# Patient Record
Sex: FEMALE | Race: WHITE | NOT HISPANIC OR LATINO | URBAN - METROPOLITAN AREA
[De-identification: names, ages, dates, MRNs, and addresses within clinical notes are randomized per-mention and may not be internally consistent; named-entity substitution may affect disease eponyms.]

---

## 2021-06-15 ENCOUNTER — LAB VISIT (OUTPATIENT)
Dept: LAB | Facility: OTHER | Age: 66
End: 2021-06-15
Attending: STUDENT IN AN ORGANIZED HEALTH CARE EDUCATION/TRAINING PROGRAM
Payer: COMMERCIAL

## 2021-06-15 ENCOUNTER — OFFICE VISIT (OUTPATIENT)
Dept: INTERNAL MEDICINE | Facility: CLINIC | Age: 66
End: 2021-06-15
Payer: COMMERCIAL

## 2021-06-15 VITALS
HEART RATE: 71 BPM | SYSTOLIC BLOOD PRESSURE: 100 MMHG | WEIGHT: 127.63 LBS | HEIGHT: 64 IN | DIASTOLIC BLOOD PRESSURE: 58 MMHG | OXYGEN SATURATION: 96 % | BODY MASS INDEX: 21.79 KG/M2

## 2021-06-15 DIAGNOSIS — Z13.220 ENCOUNTER FOR LIPID SCREENING FOR CARDIOVASCULAR DISEASE: ICD-10-CM

## 2021-06-15 DIAGNOSIS — Z11.4 SCREENING FOR HIV (HUMAN IMMUNODEFICIENCY VIRUS): ICD-10-CM

## 2021-06-15 DIAGNOSIS — R73.03 PRE-DIABETES: ICD-10-CM

## 2021-06-15 DIAGNOSIS — N95.9 POST MENOPAUSAL PROBLEMS: ICD-10-CM

## 2021-06-15 DIAGNOSIS — Z12.11 SCREENING FOR MALIGNANT NEOPLASM OF COLON: ICD-10-CM

## 2021-06-15 DIAGNOSIS — Z00.00 PREVENTATIVE HEALTH CARE: Primary | ICD-10-CM

## 2021-06-15 DIAGNOSIS — Z13.6 ENCOUNTER FOR LIPID SCREENING FOR CARDIOVASCULAR DISEASE: ICD-10-CM

## 2021-06-15 DIAGNOSIS — Z00.00 PREVENTATIVE HEALTH CARE: ICD-10-CM

## 2021-06-15 DIAGNOSIS — Z11.59 NEED FOR HEPATITIS C SCREENING TEST: ICD-10-CM

## 2021-06-15 LAB
ALBUMIN SERPL BCP-MCNC: 4.4 G/DL (ref 3.5–5.2)
ALP SERPL-CCNC: 65 U/L (ref 55–135)
ALT SERPL W/O P-5'-P-CCNC: 16 U/L (ref 10–44)
ANION GAP SERPL CALC-SCNC: 10 MMOL/L (ref 8–16)
AST SERPL-CCNC: 19 U/L (ref 10–40)
BILIRUB SERPL-MCNC: 0.4 MG/DL (ref 0.1–1)
BUN SERPL-MCNC: 21 MG/DL (ref 8–23)
CALCIUM SERPL-MCNC: 10.4 MG/DL (ref 8.7–10.5)
CHLORIDE SERPL-SCNC: 103 MMOL/L (ref 95–110)
CO2 SERPL-SCNC: 27 MMOL/L (ref 23–29)
CREAT SERPL-MCNC: 1 MG/DL (ref 0.5–1.4)
EST. GFR  (AFRICAN AMERICAN): >60 ML/MIN/1.73 M^2
EST. GFR  (NON AFRICAN AMERICAN): 60 ML/MIN/1.73 M^2
ESTIMATED AVG GLUCOSE: 111 MG/DL (ref 68–131)
GLUCOSE SERPL-MCNC: 97 MG/DL (ref 70–110)
HBA1C MFR BLD: 5.5 % (ref 4–5.6)
POTASSIUM SERPL-SCNC: 4 MMOL/L (ref 3.5–5.1)
PROT SERPL-MCNC: 7.8 G/DL (ref 6–8.4)
SODIUM SERPL-SCNC: 140 MMOL/L (ref 136–145)
TSH SERPL DL<=0.005 MIU/L-ACNC: 0.77 UIU/ML (ref 0.4–4)

## 2021-06-15 PROCEDURE — 86703 HIV-1/HIV-2 1 RESULT ANTBDY: CPT | Performed by: STUDENT IN AN ORGANIZED HEALTH CARE EDUCATION/TRAINING PROGRAM

## 2021-06-15 PROCEDURE — 86803 HEPATITIS C AB TEST: CPT | Performed by: STUDENT IN AN ORGANIZED HEALTH CARE EDUCATION/TRAINING PROGRAM

## 2021-06-15 PROCEDURE — 3008F BODY MASS INDEX DOCD: CPT | Mod: CPTII,S$GLB,, | Performed by: STUDENT IN AN ORGANIZED HEALTH CARE EDUCATION/TRAINING PROGRAM

## 2021-06-15 PROCEDURE — 1126F PR PAIN SEVERITY QUANTIFIED, NO PAIN PRESENT: ICD-10-PCS | Mod: S$GLB,,, | Performed by: STUDENT IN AN ORGANIZED HEALTH CARE EDUCATION/TRAINING PROGRAM

## 2021-06-15 PROCEDURE — 99999 PR PBB SHADOW E&M-NEW PATIENT-LVL III: ICD-10-PCS | Mod: PBBFAC,,, | Performed by: STUDENT IN AN ORGANIZED HEALTH CARE EDUCATION/TRAINING PROGRAM

## 2021-06-15 PROCEDURE — 84443 ASSAY THYROID STIM HORMONE: CPT | Performed by: STUDENT IN AN ORGANIZED HEALTH CARE EDUCATION/TRAINING PROGRAM

## 2021-06-15 PROCEDURE — 80053 COMPREHEN METABOLIC PANEL: CPT | Performed by: STUDENT IN AN ORGANIZED HEALTH CARE EDUCATION/TRAINING PROGRAM

## 2021-06-15 PROCEDURE — 1126F AMNT PAIN NOTED NONE PRSNT: CPT | Mod: S$GLB,,, | Performed by: STUDENT IN AN ORGANIZED HEALTH CARE EDUCATION/TRAINING PROGRAM

## 2021-06-15 PROCEDURE — 3008F PR BODY MASS INDEX (BMI) DOCUMENTED: ICD-10-PCS | Mod: CPTII,S$GLB,, | Performed by: STUDENT IN AN ORGANIZED HEALTH CARE EDUCATION/TRAINING PROGRAM

## 2021-06-15 PROCEDURE — 83036 HEMOGLOBIN GLYCOSYLATED A1C: CPT | Performed by: STUDENT IN AN ORGANIZED HEALTH CARE EDUCATION/TRAINING PROGRAM

## 2021-06-15 PROCEDURE — 99386 PR PREVENTIVE VISIT,NEW,40-64: ICD-10-PCS | Mod: S$GLB,,, | Performed by: STUDENT IN AN ORGANIZED HEALTH CARE EDUCATION/TRAINING PROGRAM

## 2021-06-15 PROCEDURE — 36415 COLL VENOUS BLD VENIPUNCTURE: CPT | Performed by: STUDENT IN AN ORGANIZED HEALTH CARE EDUCATION/TRAINING PROGRAM

## 2021-06-15 PROCEDURE — 99999 PR PBB SHADOW E&M-NEW PATIENT-LVL III: CPT | Mod: PBBFAC,,, | Performed by: STUDENT IN AN ORGANIZED HEALTH CARE EDUCATION/TRAINING PROGRAM

## 2021-06-15 PROCEDURE — 99386 PREV VISIT NEW AGE 40-64: CPT | Mod: S$GLB,,, | Performed by: STUDENT IN AN ORGANIZED HEALTH CARE EDUCATION/TRAINING PROGRAM

## 2021-06-15 PROCEDURE — 80061 LIPID PANEL: CPT | Performed by: STUDENT IN AN ORGANIZED HEALTH CARE EDUCATION/TRAINING PROGRAM

## 2021-06-15 RX ORDER — MULTIVIT-MIN/FOLIC ACID/LUTEIN 400-250MCG
TABLET,CHEWABLE ORAL
COMMUNITY
End: 2022-11-03

## 2021-06-16 LAB
CHOLEST SERPL-MCNC: 221 MG/DL (ref 120–199)
CHOLEST/HDLC SERPL: 3 {RATIO} (ref 2–5)
HDLC SERPL-MCNC: 73 MG/DL (ref 40–75)
HDLC SERPL: 33 % (ref 20–50)
HIV 1+2 AB+HIV1 P24 AG SERPL QL IA: NEGATIVE
LDLC SERPL CALC-MCNC: 100.4 MG/DL (ref 63–159)
NONHDLC SERPL-MCNC: 148 MG/DL
TRIGL SERPL-MCNC: 238 MG/DL (ref 30–150)

## 2021-06-17 LAB — HCV AB SERPL QL IA: NEGATIVE

## 2021-06-25 LAB — NONINV COLON CA DNA+OCC BLD SCRN STL QL: NEGATIVE

## 2021-06-30 ENCOUNTER — TELEPHONE (OUTPATIENT)
Dept: INTERNAL MEDICINE | Facility: CLINIC | Age: 66
End: 2021-06-30

## 2021-06-30 DIAGNOSIS — Z12.31 OTHER SCREENING MAMMOGRAM: ICD-10-CM

## 2021-07-23 ENCOUNTER — CLINICAL SUPPORT (OUTPATIENT)
Dept: URGENT CARE | Facility: CLINIC | Age: 66
End: 2021-07-23
Payer: COMMERCIAL

## 2021-07-23 ENCOUNTER — PATIENT MESSAGE (OUTPATIENT)
Dept: INTERNAL MEDICINE | Facility: CLINIC | Age: 66
End: 2021-07-23

## 2021-07-23 DIAGNOSIS — Z20.822 EXPOSURE TO COVID-19 VIRUS: Primary | ICD-10-CM

## 2021-07-23 DIAGNOSIS — Z20.822 CONTACT WITH AND (SUSPECTED) EXPOSURE TO COVID-19: Primary | ICD-10-CM

## 2021-07-23 LAB
CTP QC/QA: YES
SARS-COV-2 RDRP RESP QL NAA+PROBE: NEGATIVE

## 2021-07-23 PROCEDURE — U0002 COVID-19 LAB TEST NON-CDC: HCPCS | Mod: QW,S$GLB,, | Performed by: FAMILY MEDICINE

## 2021-07-23 PROCEDURE — 99211 OFF/OP EST MAY X REQ PHY/QHP: CPT | Mod: S$GLB,,, | Performed by: FAMILY MEDICINE

## 2021-07-23 PROCEDURE — 99211 PR OFFICE/OUTPT VISIT, EST, LEVL I: ICD-10-PCS | Mod: S$GLB,,, | Performed by: FAMILY MEDICINE

## 2021-07-23 PROCEDURE — U0002: ICD-10-PCS | Mod: QW,S$GLB,, | Performed by: FAMILY MEDICINE

## 2021-08-07 PROBLEM — Z91.89 AT HIGH RISK FOR BREAST CANCER: Status: ACTIVE | Noted: 2021-08-07

## 2021-08-07 PROBLEM — D18.03 LIVER HEMANGIOMA: Chronic | Status: ACTIVE | Noted: 2021-08-07

## 2021-08-07 PROBLEM — K76.89 HEPATIC CYST: Chronic | Status: ACTIVE | Noted: 2021-08-07

## 2022-04-13 ENCOUNTER — HOSPITAL ENCOUNTER (OUTPATIENT)
Dept: RADIOLOGY | Facility: OTHER | Age: 67
Discharge: HOME | End: 2022-04-13
Attending: STUDENT IN AN ORGANIZED HEALTH CARE EDUCATION/TRAINING PROGRAM
Payer: MEDICARE

## 2022-04-13 DIAGNOSIS — N95.9 POST MENOPAUSAL PROBLEMS: ICD-10-CM

## 2022-04-13 PROBLEM — M81.0 AGE-RELATED OSTEOPOROSIS WITHOUT CURRENT PATHOLOGICAL FRACTURE: Status: ACTIVE | Noted: 2022-04-13

## 2022-04-13 PROCEDURE — 77080 DXA BONE DENSITY AXIAL: CPT | Mod: 26,,, | Performed by: RADIOLOGY

## 2022-04-13 PROCEDURE — 77080 DXA BONE DENSITY AXIAL: CPT | Mod: TC

## 2022-04-13 PROCEDURE — 77080 DEXA BONE DENSITY SPINE HIP: ICD-10-PCS | Mod: 26,,, | Performed by: RADIOLOGY

## 2022-04-19 ENCOUNTER — PATIENT MESSAGE (OUTPATIENT)
Dept: INTERNAL MEDICINE | Facility: CLINIC | Age: 67
End: 2022-04-19
Payer: MEDICARE

## 2022-04-20 ENCOUNTER — PATIENT MESSAGE (OUTPATIENT)
Dept: INTERNAL MEDICINE | Facility: CLINIC | Age: 67
End: 2022-04-20
Payer: MEDICARE

## 2022-04-20 DIAGNOSIS — M81.0 AGE-RELATED OSTEOPOROSIS WITHOUT CURRENT PATHOLOGICAL FRACTURE: Primary | ICD-10-CM

## 2022-04-20 RX ORDER — ALENDRONATE SODIUM 70 MG/1
70 TABLET ORAL
Qty: 12 TABLET | Refills: 3 | Status: SHIPPED | OUTPATIENT
Start: 2022-04-20 | End: 2023-07-12 | Stop reason: SDUPTHER

## 2022-04-28 ENCOUNTER — TELEPHONE (OUTPATIENT)
Dept: INTERNAL MEDICINE | Facility: CLINIC | Age: 67
End: 2022-04-28
Payer: MEDICARE

## 2022-04-28 NOTE — TELEPHONE ENCOUNTER
----- Message from August Batista MD sent at 4/18/2022  9:09 PM CDT -----  Please help pt find a video slot

## 2022-06-14 ENCOUNTER — OFFICE VISIT (OUTPATIENT)
Dept: INTERNAL MEDICINE | Facility: CLINIC | Age: 67
End: 2022-06-14
Payer: MEDICARE

## 2022-06-14 VITALS
OXYGEN SATURATION: 97 % | HEIGHT: 64 IN | DIASTOLIC BLOOD PRESSURE: 62 MMHG | HEART RATE: 63 BPM | SYSTOLIC BLOOD PRESSURE: 100 MMHG | BODY MASS INDEX: 21.53 KG/M2 | WEIGHT: 126.13 LBS

## 2022-06-14 DIAGNOSIS — Z91.89 AT RISK FOR BLEEDING: ICD-10-CM

## 2022-06-14 DIAGNOSIS — Z00.00 ANNUAL PHYSICAL EXAM: Primary | ICD-10-CM

## 2022-06-14 DIAGNOSIS — K76.89 HEPATIC CYST: Chronic | ICD-10-CM

## 2022-06-14 DIAGNOSIS — R73.03 PRE-DIABETES: ICD-10-CM

## 2022-06-14 DIAGNOSIS — D18.03 LIVER HEMANGIOMA: Chronic | ICD-10-CM

## 2022-06-14 DIAGNOSIS — R42 VERTIGO: ICD-10-CM

## 2022-06-14 DIAGNOSIS — Z91.89 AT HIGH RISK FOR BREAST CANCER: ICD-10-CM

## 2022-06-14 DIAGNOSIS — M81.0 AGE-RELATED OSTEOPOROSIS WITHOUT CURRENT PATHOLOGICAL FRACTURE: ICD-10-CM

## 2022-06-14 DIAGNOSIS — E78.1 HYPERTRIGLYCERIDEMIA: ICD-10-CM

## 2022-06-14 PROCEDURE — 99213 PR OFFICE/OUTPT VISIT, EST, LEVL III, 20-29 MIN: ICD-10-PCS | Mod: S$PBB,,, | Performed by: STUDENT IN AN ORGANIZED HEALTH CARE EDUCATION/TRAINING PROGRAM

## 2022-06-14 PROCEDURE — 99999 PR PBB SHADOW E&M-EST. PATIENT-LVL III: ICD-10-PCS | Mod: PBBFAC,,, | Performed by: STUDENT IN AN ORGANIZED HEALTH CARE EDUCATION/TRAINING PROGRAM

## 2022-06-14 PROCEDURE — 99213 OFFICE O/P EST LOW 20 MIN: CPT | Mod: S$PBB,,, | Performed by: STUDENT IN AN ORGANIZED HEALTH CARE EDUCATION/TRAINING PROGRAM

## 2022-06-14 PROCEDURE — 99213 OFFICE O/P EST LOW 20 MIN: CPT | Mod: PBBFAC | Performed by: STUDENT IN AN ORGANIZED HEALTH CARE EDUCATION/TRAINING PROGRAM

## 2022-06-14 PROCEDURE — 99999 PR PBB SHADOW E&M-EST. PATIENT-LVL III: CPT | Mod: PBBFAC,,, | Performed by: STUDENT IN AN ORGANIZED HEALTH CARE EDUCATION/TRAINING PROGRAM

## 2022-06-14 NOTE — PROGRESS NOTES
"[facility]    Subjective:       Patient ID: Fior Moreland is a 66 y.o. female.    Chief Complaint: Annual Exam      History was obtained from the patient and supplemented through chart review.  This patient is known to me.     HPI:    Patient is a 66 y.o. female presents to for annual.    Vertigo  Improved  But still limited mobility  Severe  "my eyes flew out of my head" "it was like my brain was overflowing"  PT referral vestibular  Declines meclizine, declines epley today  Will wait on ENT until PT  Notify me if worsens    High risk of breast cancer  Monitored annually now through [facility]  PGM  ovarian cancer  1st cousin with breast cancer    HLD    Derm- [facility], Dr. Gallegos  Goes tomorrow    Follows with gyn  Hx of multiple miscarriages    Hx of incident with drinking, abdominal pain  Imaging at the time  Not problematic     Exercise: yoga at home, water aerobics at [employer]    Osteopenia vs osteoporosis  Centrum silver 1,000   Taking Vit D, ca and fosamax     - "blew out his aortic valve in my living room"  But living    Prior teacher    Medical History  Past Medical History:   Diagnosis Date    Miscarriage        Review of Systems   Constitutional: Negative for fever.        Vertigo   HENT: Negative for trouble swallowing.    Respiratory: Negative for shortness of breath.    Cardiovascular: Negative for chest pain.   Gastrointestinal: Negative for constipation, diarrhea, nausea and vomiting.   Musculoskeletal: Negative for gait problem.   Neurological: Positive for dizziness. Negative for seizures.   Psychiatric/Behavioral: Negative for hallucinations. The patient is nervous/anxious.          Surgical hx, family hx, social hx   Have been reviewed      Current Outpatient Medications:     alendronate (FOSAMAX) 70 MG tablet, Take 1 tablet (70 mg total) by mouth every 7 days., Disp: 12 tablet, Rfl: 3    mv-min-folic acid-lutein (CENTRUM SILVER) 400-250 mcg Chew, 1 " "tab(s), Disp: , Rfl:     Objective:        Body mass index is 21.65 kg/m².  Vitals:    06/14/22 1518   BP: 100/62   Pulse: 63   SpO2: 97%   Weight: 57.2 kg (126 lb 1.7 oz)   Height: 5' 4" (1.626 m)   PainSc: 0-No pain     Physical Exam  Vitals and nursing note reviewed.   Constitutional:       General: She is not in acute distress.     Appearance: Normal appearance. She is not ill-appearing.   HENT:      Head: Normocephalic and atraumatic.      Ears:      Comments: Small effusion bilat  Eyes:      General: No scleral icterus.  Pulmonary:      Effort: Pulmonary effort is normal.   Abdominal:      General: There is no distension.   Musculoskeletal:         General: No deformity.      Cervical back: Normal range of motion.   Skin:     General: Skin is warm and dry.   Neurological:      Mental Status: She is alert and oriented to person, place, and time.   Psychiatric:         Behavior: Behavior normal.           Lab Results   Component Value Date    CHOL 221 (H) 06/15/2021    TRIG 238 (H) 06/15/2021    HDL 73 06/15/2021    ALT 16 06/15/2021    AST 19 06/15/2021     06/15/2021    K 4.0 06/15/2021     06/15/2021    CREATININE 1.0 06/15/2021    BUN 21 06/15/2021    CO2 27 06/15/2021    TSH 0.767 06/15/2021    HGBA1C 5.5 06/15/2021       The 10-year ASCVD risk score (Shobonier ANDRE Jr., et al., 2013) is: 3.2%    Values used to calculate the score:      Age: 66 years      Sex: Female      Is Non- : No      Diabetic: No      Tobacco smoker: No      Systolic Blood Pressure: 100 mmHg      Is BP treated: No      HDL Cholesterol: 73 mg/dL      Total Cholesterol: 221 mg/dL    (Imaging have been independently reviewed)    Assessment:         1. Annual physical exam    2. Pre-diabetes    3. Liver hemangioma    4. At high risk for breast cancer    5. Age-related osteoporosis without current pathological fracture    6. Hepatic cyst    7. Vertigo    8. Hypertriglyceridemia    9. At risk for bleeding      "     Plan:     Fior was seen today for annual exam.    Diagnoses and all orders for this visit:    Annual physical exam  -     Comprehensive Metabolic Panel; Future  -     Lipid Panel; Future    Pre-diabetes  -     Hemoglobin A1C; Future    Liver hemangioma    At high risk for breast cancer    Age-related osteoporosis without current pathological fracture    Hepatic cyst    Vertigo  -     Ambulatory referral/consult to Physical/Occupational Therapy; Future    Hypertriglyceridemia  -     Lipid Panel; Future  -     TSH; Future    At risk for bleeding  -     CBC Auto Differential; Future        Health Maintenance  - Lipids:  6/15/2021 elevated TG  - A1C: 5.5 6/15/2021  - Colon Ca Screen: Cologuard neg 6/20/2021  - Immunizations: vaccinated; tetanus 5/2021; will get PNA with flu this fall    Women's health  - Pap: 6/19/2018? (pt states 2019), Dr. Romero  - Mammo: MRI screening/mammo 4/26/2022  - Dexa: osteoporosis 4/13/2022, taking fosamax   - Contraception: post-menopausal    Follow up in about 1 year (around 6/14/2023). or sooner prn    or sooner prn    Precautions given about vertigo, extensive communication    All medications were reviewed including potential side effects and risks/benefits.  Pt was counseled to call back if anything worsens or if questions arise.    August Batista MD  Family Medicine  [facility]  [address] [address]  Phone [phone]  Fax [phone]

## 2022-06-16 ENCOUNTER — PATIENT MESSAGE (OUTPATIENT)
Dept: INTERNAL MEDICINE | Facility: CLINIC | Age: 67
End: 2022-06-16
Payer: MEDICARE

## 2022-06-16 DIAGNOSIS — R42 VERTIGO: Primary | ICD-10-CM

## 2022-07-07 ENCOUNTER — CLINICAL SUPPORT (OUTPATIENT)
Dept: REHABILITATION | Facility: HOSPITAL | Age: 67
End: 2022-07-07
Payer: MEDICARE

## 2022-07-07 DIAGNOSIS — R26.89 IMPAIRMENT OF BALANCE: ICD-10-CM

## 2022-07-07 DIAGNOSIS — R42 VERTIGO: ICD-10-CM

## 2022-07-07 DIAGNOSIS — R42 DIZZINESS: ICD-10-CM

## 2022-07-07 PROCEDURE — 97161 PT EVAL LOW COMPLEX 20 MIN: CPT | Mod: PO

## 2022-07-07 NOTE — PLAN OF CARE
[facility]  Physical Therapy Neurological Rehabilitation Initial Evaluation    Name: Fior Moreland  Clinic Number: [id number]    Therapy Diagnosis:   Encounter Diagnoses   Name Primary?    Vertigo     Dizziness     Impairment of balance      Physician: August Batista MD    Physician Orders: PT Eval and Treat   Medical Diagnosis from Referral: Vertigo  Evaluation Date: 7/7/2022  Authorization Period Expiration: 07/07/22 to 12/31/22  Plan of Care Expiration: 09/02/22  Visit # / Visits authorized: 01/ 01    Time In: 15:15  Time Out: 16:00  Total Billable Time: 45 minutes    Precautions: Standard    Subjective   Date of onset: ~1 month ago    History of current condition - Fior reports: that vertigo episode occurred either end of May or early June 2022. She had attended a first birthday party for her grand daughter. Over the next few days, her granddaughter got a bad cold. She eventually caught this cold as well. She was running a bunch of errands in June in the heat. She laid down to take a power nap that afternoon. She turned to her right side to tell her  something, and her eye balls felt like they were floating and her brain was doing a whirling sensation. This episode lasted at least 30 minutes. Did not got to ED. She was able to settle down. Symptoms are better than initial episode, but she has some residual symptoms. Currently, she continues to have motion intolerance with certain movements and imbalance. Endorses that she was doing yoga and was very active prior to this episode. No significant issues with driving at this time. Not currently on meclizine.     History of Current Symptoms   Triggers: bending down, rolling to the right side, standing up quick, quick movements   Alleviating Factors: let's symptoms pass   Description of symptoms: no more spinning, wooziness/imbalance   Onset: ~1 month ago   Frequency: variable   Duration: variable   Positional changes:  "     Limitations due to symptoms: very careful with movement, not gardening as much any more, not doing yoga    History of migraines: she did in the past, but not in 40 years  Blood Pressure: WFL  History of Heart Condition: none     Medical History:   Past Medical History:   Diagnosis Date    Miscarriage 1984       Surgical History:   Fior Moreland  has a past surgical history that includes Breast surgery and Appendectomy.    Medications:   Fior has a current medication list which includes the following prescription(s): alendronate and centrum silver.    Allergies:   Review of patient's allergies indicates:   Allergen Reactions    Ciprofloxacin Other (See Comments)     Caused "neuro problem, felt like I was going to have a seizure."     Sulfa (sulfonamide antibiotics) Other (See Comments)        Imaging: imaging present in Epic reviewed prior to evaluation  VNG: none at this time  Audiogram: scheduled for October 2022    Prior Therapy: none prior  Social History: living spouse  Falls: 0  DME: [facility]  Home Environment: [facility], 3 steps to enter- holds hand rail  Exercise Routine / History: was doing yoga prior to episode; dog paddle swimming but very light activity  Family Present at time of Eval: none present   Occupation: not working at this time  Prior Level of Function: (I) with functional mobility/ADL, driving, active  Current Level of Function: (I) with functional mobility/ADL, driving- more cautious with activity    Pain:  None at this time.     Patient's goals: to improve motion intolerance and balance    Objective   - Follows commands: 100% of time   - Speech: no deficits       Mental status: alert, oriented to person, place, and time, normal mood, behavior, speech, dress, motor activity, and thought processes  Appearance: Casually dressed  Behavior:  Tangential  Attention Span and Concentration:  Normal    Posture Alignment in sitting/standing:   Head: forward head   Scapulae: rounded shoulders   Trunk: " "slouched posture   Pelvis: NT   Legs: WFL     Sensation: Light Touch: numbness and tingling only when she consumes too much salt          Proprioception: NT, Kinesthesia NT    Auditory: denies changes         Visual/Oculomotor Screen: denies changes - endorses that she needs to get the lens in her glasses changes  Spontaneous nystagmus (fixation present): none present  Gaze-evoked nystagmus (fixation present): none present  Tracking/Smooth Pursuits:Intact  Saccades: Intact  Convergence: WFL, < 5 cm  VOR 1: Intact  VOR cancellation: WFL  Acuity:wears glasses  R/L discrimination: Intact  Visual field: Intact  VCR: NT (head remains still, body moves)    Coordination: NT this date    ROM:   CERVICAL SPINE  Flexion 50 degrees (80-90 deg)  Extension 42 degrees (70-80 deg)  L side bend 26 degrees, R side bend 34 degrees (20-45 degrees)  L rotation 70 degrees, R rotation 61 degrees (70-90 degrees)  Are concurrent symptoms present with any of these movements: none at this time    Modified VAS (Vertebral Artery Screen), in sitting (rotation, then extension):  R: (-)  L: (-)       LIAN SENSORY TESTING:  (P= Pass, F= Fail; note any sway; hold each position for 30")  Condition 1: (firm surface/feet together/eyes open) P  Condition 2: (firm surface/feet together/eyes closed) P, min sway  Condition 3: (firm surface/feet in tandem/eyes open) F, 10 sec c/ LLE in front; 6 sec c/ RLE in front  Condition 4: (firm surface/feet in tandem/eyes closed) NT this date due to safety concerns  Condition 5: (soft surface/feet together/eyes open) P  Condition 6: (soft surface/feet together/eyes closed) F, 9 sec  Condition 7: (Fukuda step test), measure distance varied from center starting position, > 30 deg deviation to either side indicates hypofunction of biased side NT this date due to difficulty with vision eliminated conditions    Functional Gait Assessment: patient elects to perform testing bare footed  1. Gait on level surface =  3  2. " Change in Gait Speed = 3  3. Gait with horizontal head turns  = 2  4. Gait with vertical head turns = 3  5. Gait with pivot turns = 2  6. Step over obstacle = 2  7. Gait with Narrow BECCA = 0  8. Gait with eyes closed = 3  9. Ambulating Backwards = 2  10. Steps = 2     Score 22/30   Score:   <22/30 fall risk   <20/30 fall risk in older adults   <18/30 fall risk in Parkinsons       Gait Assessment:  - AD used: none  - Assistance: (I)  - Distance: community distances    Endurance Deficit: none noted    POSITIONAL CANAL TESTING- patient declined to perform today due to not having ; to be performed at first follow up visit    CMS Impairment/Limitation/Restriction for FOTO Survey- to be performed at first follow up session           TREATMENT   No treatment provided this date. Entire time spent on evaluation.     Home Exercises and Patient Education Provided    Education provided:   - plan of care (POC) scheduling    Written Home Exercises Provided: to be provided at first follow up session    Assessment   Fior is a 66 y.o. female referred to outpatient Physical Therapy with a medical diagnosis of Vertigo. Patient presents with chief complaints of decreased motion tolerance and imbalance following vertigo episode ~1 month ago. Oculomotor testing WFL with no concordant symptoms reproduced. During cervical ROM screen, no symptoms reproduced. VAS test (-) bilaterally. During GST, difficulty noted with all tested vision eliminated conditions and tandem stance. Fukuda step test not performed due to poor balance on vision eliminated conditions. Current Functional Gait Assessment score places patient in elevated fall risk category. Patient declined CRT testing today due to concern about ability to make it home since she drove herself. PT informed patient that CRT testing will need to be completed first next session, so she will need to have a  accompany her. Current presentation appears most consistent with  potential peripheral vestibulopathy (likely neuritis). Patient to benefit from continued PT intervention to further address balance and motion tolerance deficits.     Patient prognosis is Good.   Patient will benefit from skilled outpatient Physical Therapy to address the deficits stated above and in the chart below, provide patient/family education, and to maximize patient's level of independence.     Plan of care discussed with patient: Yes  Patient's spiritual, cultural and educational needs considered and patient is agreeable to the plan of care and goals as stated below:     Anticipated Barriers for therapy: none noted    Medical Necessity is demonstrated by the following  History  Co-morbidities and personal factors that may impact the plan of care Co-morbidities:   Liver hemangioma, pre-diabetes, age related osteoporosis s/ fracture    Personal Factors:   no deficits     high   Examination  Body Structures and Functions, activity limitations and participation restrictions that may impact the plan of care Body Regions:   head  lower extremities  trunk    Body Systems:    gross symmetry  ROM  strength  gross coordinated movement  balance  gait  transfers  transitions  motor control  motor learning  vestibular function    Participation Restrictions:   None noted    Activity limitations:   Learning and applying knowledge  no deficits    General Tasks and Commands  no deficits    Communication  no deficits    Mobility  quick movements, rolling in bed    Self care  More cautious with movement    Domestic Life  more cautious with movement    Interactions/Relationships  no deficits    Life Areas  no deficits    Community and Social Life  community life  recreation and leisure         moderate   Clinical Presentation stable and uncomplicated low   Decision Making/ Complexity Score: low     Goals:  Short Term Goals: 4 weeks   1. Patient to be (I) with established home exercise program.   2. Patient to pass GST  condition 2 with no additional sway for improved balance in low vision environments.   3. Patient to improve GST condition 3 to at least 15 seconds for improved balance and decreased fall risk.   4. Patient to improve GST condition 6 to at least 20 seconds for improved balance in low vision environments.     Long Term Goals: 8 weeks   1. Patient to be (I) with advanced home exercise program.   2. Patient to improve GST condition 3 to at least 20 seconds for improved balance and decreased fall risk.   3. Patient to improve GST condition 6 to at least 30 seconds for improved balance in low vision environments.  4. Patient to improve Functional Gait Assessment score to at least 26/30 for improved balance in community environments.      Plan   Plan of care Certification: 7/7/2022 to 09/02/22.    Outpatient Physical Therapy 2 times weekly for 8 weeks to include the following interventions: Gait Training, Manual Therapy, Moist Heat/ Ice, Neuromuscular Re-ed, Orthotic Management and Training, Patient Education, Self Care, Therapeutic Activities, Therapeutic Exercise and Canalith Repositioning Procedures.     Next session: FOTO; CRTs; balance training    Tamiko Sandoval, PT

## 2022-07-08 ENCOUNTER — PATIENT MESSAGE (OUTPATIENT)
Dept: INTERNAL MEDICINE | Facility: CLINIC | Age: 67
End: 2022-07-08
Payer: MEDICARE

## 2022-07-08 ENCOUNTER — LAB VISIT (OUTPATIENT)
Dept: LAB | Facility: OTHER | Age: 67
End: 2022-07-08
Attending: STUDENT IN AN ORGANIZED HEALTH CARE EDUCATION/TRAINING PROGRAM
Payer: MEDICARE

## 2022-07-08 DIAGNOSIS — Z91.89 AT RISK FOR BLEEDING: ICD-10-CM

## 2022-07-08 DIAGNOSIS — Z00.00 ANNUAL PHYSICAL EXAM: ICD-10-CM

## 2022-07-08 DIAGNOSIS — R73.03 PRE-DIABETES: ICD-10-CM

## 2022-07-08 DIAGNOSIS — E78.1 HYPERTRIGLYCERIDEMIA: ICD-10-CM

## 2022-07-08 LAB
ALBUMIN SERPL BCP-MCNC: 4.2 G/DL (ref 3.5–5.2)
ALP SERPL-CCNC: 67 U/L (ref 55–135)
ALT SERPL W/O P-5'-P-CCNC: 20 U/L (ref 10–44)
ANION GAP SERPL CALC-SCNC: 9 MMOL/L (ref 8–16)
AST SERPL-CCNC: 20 U/L (ref 10–40)
BASOPHILS # BLD AUTO: 0.05 K/UL (ref 0–0.2)
BASOPHILS NFR BLD: 0.8 % (ref 0–1.9)
BILIRUB SERPL-MCNC: 0.7 MG/DL (ref 0.1–1)
BUN SERPL-MCNC: 17 MG/DL (ref 8–23)
CALCIUM SERPL-MCNC: 9.7 MG/DL (ref 8.7–10.5)
CHLORIDE SERPL-SCNC: 105 MMOL/L (ref 95–110)
CHOLEST SERPL-MCNC: 219 MG/DL (ref 120–199)
CHOLEST/HDLC SERPL: 3 {RATIO} (ref 2–5)
CO2 SERPL-SCNC: 29 MMOL/L (ref 23–29)
CREAT SERPL-MCNC: 0.8 MG/DL (ref 0.5–1.4)
DIFFERENTIAL METHOD: ABNORMAL
EOSINOPHIL # BLD AUTO: 0 K/UL (ref 0–0.5)
EOSINOPHIL NFR BLD: 0.7 % (ref 0–8)
ERYTHROCYTE [DISTWIDTH] IN BLOOD BY AUTOMATED COUNT: 12.8 % (ref 11.5–14.5)
EST. GFR  (AFRICAN AMERICAN): >60 ML/MIN/1.73 M^2
EST. GFR  (NON AFRICAN AMERICAN): >60 ML/MIN/1.73 M^2
ESTIMATED AVG GLUCOSE: 111 MG/DL (ref 68–131)
GLUCOSE SERPL-MCNC: 93 MG/DL (ref 70–110)
HBA1C MFR BLD: 5.5 % (ref 4–5.6)
HCT VFR BLD AUTO: 40.9 % (ref 37–48.5)
HDLC SERPL-MCNC: 74 MG/DL (ref 40–75)
HDLC SERPL: 33.8 % (ref 20–50)
HGB BLD-MCNC: 13.2 G/DL (ref 12–16)
IMM GRANULOCYTES # BLD AUTO: 0.01 K/UL (ref 0–0.04)
IMM GRANULOCYTES NFR BLD AUTO: 0.2 % (ref 0–0.5)
LDLC SERPL CALC-MCNC: 131 MG/DL (ref 63–159)
LYMPHOCYTES # BLD AUTO: 2 K/UL (ref 1–4.8)
LYMPHOCYTES NFR BLD: 33.5 % (ref 18–48)
MCH RBC QN AUTO: 31.5 PG (ref 27–31)
MCHC RBC AUTO-ENTMCNC: 32.3 G/DL (ref 32–36)
MCV RBC AUTO: 98 FL (ref 82–98)
MONOCYTES # BLD AUTO: 0.5 K/UL (ref 0.3–1)
MONOCYTES NFR BLD: 8.6 % (ref 4–15)
NEUTROPHILS # BLD AUTO: 3.4 K/UL (ref 1.8–7.7)
NEUTROPHILS NFR BLD: 56.2 % (ref 38–73)
NONHDLC SERPL-MCNC: 145 MG/DL
NRBC BLD-RTO: 0 /100 WBC
PLATELET # BLD AUTO: 278 K/UL (ref 150–450)
PMV BLD AUTO: 10.2 FL (ref 9.2–12.9)
POTASSIUM SERPL-SCNC: 4.6 MMOL/L (ref 3.5–5.1)
PROT SERPL-MCNC: 7 G/DL (ref 6–8.4)
RBC # BLD AUTO: 4.19 M/UL (ref 4–5.4)
SODIUM SERPL-SCNC: 143 MMOL/L (ref 136–145)
TRIGL SERPL-MCNC: 70 MG/DL (ref 30–150)
TSH SERPL DL<=0.005 MIU/L-ACNC: 1.96 UIU/ML (ref 0.4–4)
WBC # BLD AUTO: 6.03 K/UL (ref 3.9–12.7)

## 2022-07-08 PROCEDURE — 80053 COMPREHEN METABOLIC PANEL: CPT | Performed by: STUDENT IN AN ORGANIZED HEALTH CARE EDUCATION/TRAINING PROGRAM

## 2022-07-08 PROCEDURE — 85025 COMPLETE CBC W/AUTO DIFF WBC: CPT | Performed by: STUDENT IN AN ORGANIZED HEALTH CARE EDUCATION/TRAINING PROGRAM

## 2022-07-08 PROCEDURE — 80061 LIPID PANEL: CPT | Performed by: STUDENT IN AN ORGANIZED HEALTH CARE EDUCATION/TRAINING PROGRAM

## 2022-07-08 PROCEDURE — 36415 COLL VENOUS BLD VENIPUNCTURE: CPT | Performed by: STUDENT IN AN ORGANIZED HEALTH CARE EDUCATION/TRAINING PROGRAM

## 2022-07-08 PROCEDURE — 84443 ASSAY THYROID STIM HORMONE: CPT | Performed by: STUDENT IN AN ORGANIZED HEALTH CARE EDUCATION/TRAINING PROGRAM

## 2022-07-08 PROCEDURE — 83036 HEMOGLOBIN GLYCOSYLATED A1C: CPT | Performed by: STUDENT IN AN ORGANIZED HEALTH CARE EDUCATION/TRAINING PROGRAM

## 2022-07-18 ENCOUNTER — CLINICAL SUPPORT (OUTPATIENT)
Dept: REHABILITATION | Facility: HOSPITAL | Age: 67
End: 2022-07-18
Payer: MEDICARE

## 2022-07-18 DIAGNOSIS — R26.89 IMPAIRMENT OF BALANCE: ICD-10-CM

## 2022-07-18 DIAGNOSIS — R42 DIZZINESS: Primary | ICD-10-CM

## 2022-07-18 PROCEDURE — 97112 NEUROMUSCULAR REEDUCATION: CPT | Mod: PO

## 2022-07-18 PROCEDURE — 97110 THERAPEUTIC EXERCISES: CPT | Mod: PO

## 2022-07-18 NOTE — PROGRESS NOTES
"[facility]   Physical Therapy Treatment Note     Name: Fior Moreland  Clinic Number: [id number]    Therapy Diagnosis: No diagnosis found.  Physician: August Batista MD    Visit Date: 7/18/2022    Physician Orders: PT Eval and Treat   Medical Diagnosis from Referral: Vertigo  Evaluation Date: 7/7/2022  Authorization Period Expiration: 07/07/22 to 12/31/22  Plan of Care Expiration: 09/02/22  Visit # / Visits authorized: 01/ 20    PTA Visit #: 0/5     Time In: 14:40  Time Out: 15: 25  Total Billable Time: 45 minutes    SUBJECTIVE     Pt reports: Yesterday I weeded in the garden, and been able to reach down. She reports that her symptoms are gradually getting better.   Response to previous treatment: felt fine  Functional change: on going    Pain: 0/10  Location: denies any pain    OBJECTIVE     Objective Measures updated at progress report unless specified.     Treatment     Fior received the treatments listed below:      therapeutic exercises to develop strength, endurance and core stabilization for 20 minutes including:  FOTO 49%    Smooth pursuits; WNL  Saccades: WNL  Head thrust: negative  Positional Testing:   right carrizales pike: negative, no dizziness   Left carrizales pike: negative, no dizziness    VOR x 1 horizontal, standing, clean "X"  background, x 60 sec,  Self selected pace  VOR x 1 vertical, standing, clean "X" background x 60 sec, self selected pace  VOR x 1 horizontal, standing, "B" busy checkered  background, 2 x 30 sec, self selected pace  VOR x 1 vertical, seated/standing, "B" busy background 2 x 30 sec, self selected pace    Bending forward to ground for shoe, gets a "ping" in her right temple (not described as pain or dizziness)      neuromuscular re-education activities to improve: Balance, proprioception for 25 minutes. The following activities were included:  Quadruped cat/camel x 10   Quadruped threading the needle x 10 both sides    At ballet bar:  On airex " "pad   Feet together 30 sec   Feet together eyes closed 30 sec x 2    Feet together with head rotation right/left 2 x 30 sec   Feet together with head nods up/down 2 x 30 sec    Walking with head turns right and left x 100ft  Walking with head turns up/down x 100ft      Patient Education and Home Exercises     Home Exercises Provided and Patient Education Provided     Education provided:   - role of Physical Therapy, vestibular pathology, HEP    Written Home Exercises Provided: yes. Exercises were reviewed and Fior was able to demonstrate them prior to the end of the session.  Fior demonstrated good  understanding of the education provided. See EMR under Patient Instructions for exercises provided during therapy sessions    ASSESSMENT     Ms. Moreland had negative positional testing indicating negative BPPV.  She does not report any increase in dizziness with VOR exercises today.  She has increased instability standing on foam with head rotations and with eyes closed, occasionally requires upper extremity assist.  She also noted a "ping" on the right side of her head when reaching down to put on her shoes, however this was not considered pain or dizziness, and this was the only movement that caused sensation.  She was given VOR x 1 to start as HEP.  She will continue to benefit from skilled Physical Therapy to address limitations and maximize functional mobility.     Fior Is progressing well towards her goals.   Pt prognosis is Good.     Pt will continue to benefit from skilled outpatient physical therapy to address the deficits listed in the problem list box on initial evaluation, provide pt/family education and to maximize pt's level of independence in the home and community environment.     Pt's spiritual, cultural and educational needs considered and pt agreeable to plan of care and goals.     Anticipated barriers to physical therapy: none    Goals:  Short Term Goals: 4 weeks   1. Patient to be (I) with " established home exercise program. on going   2. Patient to pass GST condition 2 with no additional sway for improved balance in low vision environments. on going   3. Patient to improve GST condition 3 to at least 15 seconds for improved balance and decreased fall risk. on going   4. Patient to improve GST condition 6 to at least 20 seconds for improved balance in low vision environments. on going      Long Term Goals: 8 weeks   1. Patient to be (I) with advanced home exercise program. on going   2. Patient to improve GST condition 3 to at least 20 seconds for improved balance and decreased fall risk. on going   3. Patient to improve GST condition 6 to at least 30 seconds for improved balance in low vision environments. on going   4. Patient to improve Functional Gait Assessment score to at least 26/30 for improved balance in community environments.  on going     PLAN     Progress standing balance, initiate motion tolerance    Mikayla Duran, PT

## 2022-07-22 ENCOUNTER — CLINICAL SUPPORT (OUTPATIENT)
Dept: REHABILITATION | Facility: HOSPITAL | Age: 67
End: 2022-07-22
Payer: MEDICARE

## 2022-07-22 DIAGNOSIS — R26.89 IMPAIRMENT OF BALANCE: ICD-10-CM

## 2022-07-22 DIAGNOSIS — R42 DIZZINESS: Primary | ICD-10-CM

## 2022-07-22 PROCEDURE — 97112 NEUROMUSCULAR REEDUCATION: CPT | Mod: PO

## 2022-07-22 NOTE — PROGRESS NOTES
"[facility]   Physical Therapy Treatment Note     Name: Fior Moreland  Clinic Number: [id number]    Therapy Diagnosis:   Encounter Diagnoses   Name Primary?    Dizziness Yes    Impairment of balance      Physician: August Batista MD    Visit Date: 7/22/2022    Physician Orders: PT Eval and Treat   Medical Diagnosis from Referral: Vertigo  Evaluation Date: 7/7/2022  Authorization Period Expiration: 07/07/22 to 12/31/22  Plan of Care Expiration: 09/02/22  Visit # / Visits authorized: 02/ 20    PTA Visit #: 0/5     Time In: 14:25  Time Out: 15:04  Total Billable Time: 39 minutes    SUBJECTIVE     Pt reports: that her dizziness is better. Bending down is also better. After she finishes her exercises, her right ear was itching, but it did not happen today.   Response to previous treatment: felt fine  Functional change: on going    Pain: 0/10  Location: denies any pain    OBJECTIVE     Objective Measures updated at progress report unless specified.     Treatment     Fior received the treatments listed below:      neuromuscular re-education activities to improve: Balance, proprioception for 39 minutes. The following activities were included:    VOR x 1 horizontal, standing, "B" busy checkered  background, 2 x 30 sec, self selected pace  VOR x 1 vertical, standing, "B" busy background 2 x 30 sec, self selected pace    Quadruped cat/camel x 10   Quadruped threading the needle x 10 both sides    Standing cone transfer:  X 8 cones from ground on right side to plinth on left side- no dizziness  X 8 cones from ground on left side to plinth on right side- no dizziness    // bars:  On airex pad, CGA   Feet together eyes closed 30 sec x 3, occ touchdown support   Feet together with head rotation right/left 2 x 30 sec, occ touchdown support   Feet together with head nods up/down 2 x 30 sec    2 x 30" each lower extremity tandem stance, eyes open, intermittent touchdown support, CGA to " SBA    Ambulation with head movements:  2 x 100 feet right<>L left head movements, slight sway, SBA  2 x 100 feet up <> down head movements, slight sway, SBA  2 x 100 feet RUQ <> LLQ diagonal head movements, min sway, SBA  2 x 100 feet LUQ <> RLQ diagonal head movements, min sway, SBA  2 x 100 feet ambulation in hallway with vertical self ball toss/catch, SBA      Patient Education and Home Exercises     Home Exercises Provided and Patient Education Provided     Education provided:   - role of Physical Therapy, vestibular pathology, HEP    Written Home Exercises Provided: yes. Exercises were reviewed and Fior was able to demonstrate them prior to the end of the session.  Fior demonstrated good  understanding of the education provided. See EMR under Patient Instructions for exercises provided during therapy sessions    ASSESSMENT     Fior tolerated therapy session very well this afternoon. No dizziness elicited throughout gaze stabilization, motion tolerance or balance activities. Balance appropriately challenged with foam activities, tandem stance, and ambulation with head movements. Plan to continue to address balance deficits at future appointments as this remains greatest remaining vestibular impairment.  She will continue to benefit from skilled Physical Therapy to address limitations and maximize functional mobility.     Fior Is progressing well towards her goals.   Pt prognosis is Good.     Pt will continue to benefit from skilled outpatient physical therapy to address the deficits listed in the problem list box on initial evaluation, provide pt/family education and to maximize pt's level of independence in the home and community environment.     Pt's spiritual, cultural and educational needs considered and pt agreeable to plan of care and goals.     Anticipated barriers to physical therapy: none    Goals:  Short Term Goals: 4 weeks   1. Patient to be (I) with established home exercise program. on going    2. Patient to pass GST condition 2 with no additional sway for improved balance in low vision environments. on going   3. Patient to improve GST condition 3 to at least 15 seconds for improved balance and decreased fall risk. on going   4. Patient to improve GST condition 6 to at least 20 seconds for improved balance in low vision environments. on going      Long Term Goals: 8 weeks   1. Patient to be (I) with advanced home exercise program. on going   2. Patient to improve GST condition 3 to at least 20 seconds for improved balance and decreased fall risk. on going   3. Patient to improve GST condition 6 to at least 30 seconds for improved balance in low vision environments. on going   4. Patient to improve Functional Gait Assessment score to at least 26/30 for improved balance in community environments.  on going     PLAN     Progress standing balance, initiate motion tolerance    Tamiko Sandoval, PT

## 2022-07-25 ENCOUNTER — CLINICAL SUPPORT (OUTPATIENT)
Dept: REHABILITATION | Facility: HOSPITAL | Age: 67
End: 2022-07-25
Payer: MEDICARE

## 2022-07-25 DIAGNOSIS — R42 DIZZINESS: Primary | ICD-10-CM

## 2022-07-25 DIAGNOSIS — R26.89 IMPAIRMENT OF BALANCE: ICD-10-CM

## 2022-07-25 PROCEDURE — 97112 NEUROMUSCULAR REEDUCATION: CPT | Mod: PO,CQ

## 2022-07-25 NOTE — PROGRESS NOTES
"[facility]   Physical Therapy Treatment Note     Name: Fior Moreland  Clinic Number: [id number]    Therapy Diagnosis:   No diagnosis found.  Physician: August Batista MD    Visit Date: 7/25/2022    Physician Orders: PT Eval and Treat   Medical Diagnosis from Referral: Vertigo  Evaluation Date: 7/7/2022  Authorization Period Expiration: 07/07/22 to 12/31/22  Plan of Care Expiration: 09/02/22  Visit # / Visits authorized: 03/ 20    PTA Visit #: 0/5     Time In: 15:30  Time Out: 16:15  Total Billable Time: 45 minutes    SUBJECTIVE     Pt reports: that her dizziness is better. Bending down is also better. After she finishes her exercises, her right ear was itching, but it did not happen today.   Response to previous treatment: felt fine  Functional change: on going    Pain: 0/10  Location: denies any pain    OBJECTIVE     Objective Measures updated at progress report unless specified.     Treatment     Fior received the treatments listed below:      neuromuscular re-education activities to improve: Balance, proprioception for 39 minutes. The following activities were included:    Quadruped cat/camel x 10   Quadruped threading the needle x 10 both sides    // bars:  On airex pad, CGA   Feet together eyes closed 30 sec x 3, occ touchdown support   Feet together with head rotation right/left 2 x 30 sec, occ touchdown support   Feet together with head nods up/down 2 x 30 sec    2 x 30" each lower extremity tandem stance, eyes open, intermittent touchdown support, CGA to SBA    Ambulation with head movements:  2 x 100 feet right<>L left head movements, slight sway, SBA  2 x 100 feet up <> down head movements, slight sway, SBA  2 x 100 feet RUQ <> LLQ diagonal head movements, min sway, SBA  2 x 100 feet LUQ <> RLQ diagonal head movements, min sway, SBA  2 x 100 feet ambulation in hallway with vertical self ball toss/catch, SBA      Patient Education and Home Exercises     Home Exercises " Provided and Patient Education Provided     Education provided:   - role of Physical Therapy, vestibular pathology, HEP    Written Home Exercises Provided: yes. Exercises were reviewed and Fior was able to demonstrate them prior to the end of the session.  Fior demonstrated good  understanding of the education provided. See EMR under Patient Instructions for exercises provided during therapy sessions    ASSESSMENT     Fior tolerated therapy session very well this afternoon. No dizziness elicited throughout gaze stabilization, motion tolerance or balance activities. Balance appropriately challenged with foam activities, tandem stance, and ambulation with head movements. Plan to continue to address balance deficits at future appointments as this remains greatest remaining vestibular impairment.  She will continue to benefit from skilled Physical Therapy to address limitations and maximize functional mobility.     Fior Is progressing well towards her goals.   Pt prognosis is Good.     Pt will continue to benefit from skilled outpatient physical therapy to address the deficits listed in the problem list box on initial evaluation, provide pt/family education and to maximize pt's level of independence in the home and community environment.     Pt's spiritual, cultural and educational needs considered and pt agreeable to plan of care and goals.     Anticipated barriers to physical therapy: none    Goals:  Short Term Goals: 4 weeks   1. Patient to be (I) with established home exercise program. on going   2. Patient to pass GST condition 2 with no additional sway for improved balance in low vision environments. on going   3. Patient to improve GST condition 3 to at least 15 seconds for improved balance and decreased fall risk. on going   4. Patient to improve GST condition 6 to at least 20 seconds for improved balance in low vision environments. on going      Long Term Goals: 8 weeks   1. Patient to be (I) with advanced  home exercise program. on going   2. Patient to improve GST condition 3 to at least 20 seconds for improved balance and decreased fall risk. on going   3. Patient to improve GST condition 6 to at least 30 seconds for improved balance in low vision environments. on going   4. Patient to improve Functional Gait Assessment score to at least 26/30 for improved balance in community environments.  on going     PLAN     Progress standing balance, initiate motion tolerance    Tamiko Sandoval, PT

## 2022-07-26 NOTE — PROGRESS NOTES
"[facility]   Physical Therapy Treatment Note     Name: Fior WALTON Merly  Clinic Number: [id number]    Therapy Diagnosis:   Encounter Diagnoses   Name Primary?    Dizziness Yes    Impairment of balance      Physician: August Batista MD    Visit Date: 7/27/2022    Physician Orders: PT Eval and Treat   Medical Diagnosis from Referral: Vertigo  Evaluation Date: 7/7/2022  Authorization Period Expiration: 07/07/22 to 12/31/22  Plan of Care Expiration: 09/02/22  Visit # / Visits authorized: 04/ 20    PTA Visit #: 0/5     Time In: 12:35  Time Out: 13:15  Total Billable Time: 40 minutes    SUBJECTIVE     Pt reports: She has had a lot going on, but she is feeling better than she has been.   Response to previous treatment: felt fine  Functional change: on going    Pain: 0/10  Location: denies any pain    OBJECTIVE     Objective Measures updated at progress report unless specified.     Treatment     Fior received the treatments listed below:      neuromuscular re-education activities to improve: Balance, proprioception for 40 minutes. The following activities were included:    // bars:  On airex pad, CGA   Feet together eyes closed 30 sec x 3, occ touchdown support   Feet together with head rotation right/left 2 x 30 sec, occ touchdown support   Feet together with head nods up/down 2 x 30 sec    3 x 30 sec Split stance with 1 LE on foam fitter, no UE support, CGA  3 x 30" each lower extremity tandem stance, eyes open, intermittent touchdown support, CGA to SBA    On rocker board               Lateral weight shifts 2 x 30 sec CGA Min A(COG deviated posteriorly               AP weight shifts in staggered stance right foot fwd CGA Min A               AP weight shifts in staggered stance left foot fwd CGA Min A     Ambulation with head movements:  2 x 100 feet right<>L left head movements, slight sway, SBA  2 x 100 feet up <> down head movements, slight sway, SBA  2 x 100 feet RUQ <> LLQ " diagonal head movements, min sway, SBA  2 x 100 feet LUQ <> RLQ diagonal head movements, min sway, SBA  2 x 100 feet forward/backward ambulation with ball toss to PT, SBA      Patient Education and Home Exercises     Home Exercises Provided and Patient Education Provided     Education provided:   - role of Physical Therapy, vestibular pathology, HEP    Written Home Exercises Provided: yes. Exercises were reviewed and Fior was able to demonstrate them prior to the end of the session.  Fior demonstrated good  understanding of the education provided. See EMR under Patient Instructions for exercises provided during therapy sessions    ASSESSMENT     Fior tolerated therapy session well this afternoon. No increase in symptoms with hallway walking or balance activities. Progressed to tossing ball with PT rather than self ball toss. Continues to be most challenged by weight shifting on the rocker board. Plan to continue to address balance deficits at future appointments as this remains greatest remaining vestibular impairment.  She will continue to benefit from skilled Physical Therapy to address limitations and maximize functional mobility.     Fior Is progressing well towards her goals.   Pt prognosis is Good.     Pt will continue to benefit from skilled outpatient physical therapy to address the deficits listed in the problem list box on initial evaluation, provide pt/family education and to maximize pt's level of independence in the home and community environment.     Pt's spiritual, cultural and educational needs considered and pt agreeable to plan of care and goals.     Anticipated barriers to physical therapy: none    Goals:  Short Term Goals: 4 weeks   1. Patient to be (I) with established home exercise program. on going   2. Patient to pass GST condition 2 with no additional sway for improved balance in low vision environments. on going   3. Patient to improve GST condition 3 to at least 15 seconds for improved  balance and decreased fall risk. on going   4. Patient to improve GST condition 6 to at least 20 seconds for improved balance in low vision environments. on going      Long Term Goals: 8 weeks   1. Patient to be (I) with advanced home exercise program. on going   2. Patient to improve GST condition 3 to at least 20 seconds for improved balance and decreased fall risk. on going   3. Patient to improve GST condition 6 to at least 30 seconds for improved balance in low vision environments. on going   4. Patient to improve Functional Gait Assessment score to at least 26/30 for improved balance in community environments.  on going     PLAN     Progress standing balance, initiate motion tolerance    Cassie Albert, GEORGI    I certify that I was present in the room directing the student in service delivery and guiding them using my skilled judgment. As the co-signing therapist I have reviewed the students documentation and am responsible for the treatment, assessment, and plan.     Tamiko Sandoval, PT

## 2022-07-27 ENCOUNTER — CLINICAL SUPPORT (OUTPATIENT)
Dept: REHABILITATION | Facility: HOSPITAL | Age: 67
End: 2022-07-27
Payer: MEDICARE

## 2022-07-27 DIAGNOSIS — R26.89 IMPAIRMENT OF BALANCE: ICD-10-CM

## 2022-07-27 DIAGNOSIS — R42 DIZZINESS: Primary | ICD-10-CM

## 2022-07-27 PROCEDURE — 97112 NEUROMUSCULAR REEDUCATION: CPT | Mod: PO

## 2022-08-01 NOTE — PROGRESS NOTES
"[facility]   Physical Therapy Treatment Note     Name: Fior Moreland  Clinic Number: [id number]    Therapy Diagnosis:   Encounter Diagnoses   Name Primary?    Dizziness Yes    Impairment of balance      Physician: August Batista MD    Visit Date: 8/2/2022    Physician Orders: PT Eval and Treat   Medical Diagnosis from Referral: Vertigo  Evaluation Date: 7/7/2022  Authorization Period Expiration: 07/07/22 to 12/31/22  Plan of Care Expiration: 09/02/22  Visit # / Visits authorized: 05/ 20    PTA Visit #: 0/5     Time In: 12:45  Time Out: 13:15  Total Billable Time: 30 minutes    SUBJECTIVE     Pt reports: She is doing fine this afternoon. She apologizes for running late.   Response to previous treatment: no adverse effects  Functional change: on going    Pain: 0/10  Location: denies any pain    OBJECTIVE     Objective Measures updated at progress report unless specified.     Treatment     Fior received the treatments listed below:      neuromuscular re-education activities to improve: Balance, proprioception for 30 minutes. The following activities were included:    // bars:    X 4 laps tandem ambulation in // bars, CGA, occ touchdown support    On airex pad, CGA   Feet together eyes closed 30 sec x 3, no UE support   Feet together with head rotation right/left 2 x 30 sec, no UE support   Feet together with head nods up/down 2 x 30 sec    3 x 30" each lower extremity tandem stance, eyes open, intermittent touchdown support, CGA to SBA    On rocker board               Lateral weight shifts 2 x 30 sec CGA, touchdown support               AP weight shifts 3 x 30 sec, CGA, touchdown support                 Ambulation with head movements:  2 x 100 feet right<>L left head movements, slight sway, SBA  2 x 100 feet up <> down head movements, slight sway, SBA  2 x 100 feet RUQ <> LLQ diagonal head movements, min sway, SBA  2 x 100 feet LUQ <> RLQ diagonal head movements, min sway, " SBA      Patient Education and Home Exercises     Home Exercises Provided and Patient Education Provided     Education provided:   - role of Physical Therapy, vestibular pathology, HEP    Written Home Exercises Provided: yes. Exercises were reviewed and Fior was able to demonstrate them prior to the end of the session.  Fior demonstrated good  understanding of the education provided. See EMR under Patient Instructions for exercises provided during therapy session on 07/18/22.    ASSESSMENT     Fior tolerated therapy session well this afternoon. No increase in symptoms with hallway walking or balance activities. Tandem activities remain most challenging at this time. Plan to continue to address balance deficits at future appointments as this remains greatest remaining vestibular impairment.  She will continue to benefit from skilled Physical Therapy to address limitations and maximize functional mobility.     Fior Is progressing well towards her goals.   Pt prognosis is Good.     Pt will continue to benefit from skilled outpatient physical therapy to address the deficits listed in the problem list box on initial evaluation, provide pt/family education and to maximize pt's level of independence in the home and community environment.     Pt's spiritual, cultural and educational needs considered and pt agreeable to plan of care and goals.     Anticipated barriers to physical therapy: none    Goals:  Short Term Goals: 4 weeks   1. Patient to be (I) with established home exercise program. on going   2. Patient to pass GST condition 2 with no additional sway for improved balance in low vision environments. on going   3. Patient to improve GST condition 3 to at least 15 seconds for improved balance and decreased fall risk. on going   4. Patient to improve GST condition 6 to at least 20 seconds for improved balance in low vision environments. on going      Long Term Goals: 8 weeks   1. Patient to be (I) with advanced  home exercise program. on going   2. Patient to improve GST condition 3 to at least 20 seconds for improved balance and decreased fall risk. on going   3. Patient to improve GST condition 6 to at least 30 seconds for improved balance in low vision environments. on going   4. Patient to improve Functional Gait Assessment score to at least 26/30 for improved balance in community environments.  on going     PLAN     Progress standing balance, initiate motion tolerance    Tamiko Sandoval, PT

## 2022-08-02 ENCOUNTER — CLINICAL SUPPORT (OUTPATIENT)
Dept: REHABILITATION | Facility: HOSPITAL | Age: 67
End: 2022-08-02
Payer: MEDICARE

## 2022-08-02 DIAGNOSIS — R26.89 IMPAIRMENT OF BALANCE: ICD-10-CM

## 2022-08-02 DIAGNOSIS — R42 DIZZINESS: Primary | ICD-10-CM

## 2022-08-02 PROCEDURE — 97112 NEUROMUSCULAR REEDUCATION: CPT | Mod: PO

## 2022-08-04 ENCOUNTER — CLINICAL SUPPORT (OUTPATIENT)
Dept: REHABILITATION | Facility: HOSPITAL | Age: 67
End: 2022-08-04
Payer: MEDICARE

## 2022-08-04 DIAGNOSIS — R26.89 IMPAIRMENT OF BALANCE: ICD-10-CM

## 2022-08-04 DIAGNOSIS — R42 DIZZINESS: Primary | ICD-10-CM

## 2022-08-04 PROCEDURE — 97112 NEUROMUSCULAR REEDUCATION: CPT | Mod: PO

## 2022-08-04 NOTE — PATIENT INSTRUCTIONS
BOSU- soft side down  Perform near a wall or counter top for upper extremity support as needed.     Keep 1 leg on ground and place other leg in middle of BOSU. Eyes open. Maintain balance for 30 seconds. Perform 2 times on each foot.

## 2022-08-04 NOTE — PROGRESS NOTES
"[facility]   Physical Therapy Treatment Note     Name: Fior Moreland  Clinic Number: [id number]    Therapy Diagnosis:   Encounter Diagnoses   Name Primary?    Dizziness Yes    Impairment of balance      Physician: August Batista MD    Visit Date: 8/4/2022    Physician Orders: PT Eval and Treat   Medical Diagnosis from Referral: Vertigo  Evaluation Date: 7/7/2022  Authorization Period Expiration: 07/07/22 to 12/31/22  Plan of Care Expiration: 09/02/22  Visit # / Visits authorized: 06/ 20    PTA Visit #: 0/5     Time In: 13:05  Time Out: 13:45  Total Billable Time: 40 minutes    SUBJECTIVE     Pt reports: She is doing fine this afternoon.  Response to previous treatment: no adverse effects  Functional change: on going    Pain: 0/10  Location: denies any pain    OBJECTIVE     Objective Measures updated at progress report unless specified.     Treatment     Fior received the treatments listed below:      neuromuscular re-education activities to improve: Balance, proprioception for 40 minutes. The following activities were included:    // bars:    X 6 laps tandem ambulation in // bars, CGA, occ touchdown support    On airex pad, CGA   Feet together eyes closed 30 sec x 3, no UE support   Feet together with head rotation right/left 2 x 30 sec, no UE support   Feet together with head nods up/down 2 x 30 sec    2 x 30" each lower extremity, single leg stance with alternate lower extremity on BOSU, soft side down, eyes open, CGA, occ touchdown support    On rocker board               Lateral weight shifts 2 x 30 sec CGA, touchdown support               AP weight shifts 2 x 30 sec, CGA, touchdown support                 Ambulation with head movements:  2 x 100 feet right<>L left head movements, slight sway, SBA  2 x 100 feet up <> down head movements, slight sway, SBA  2 x 100 feet RUQ <> LLQ diagonal head movements, min sway, SBA  2 x 100 feet LUQ <> RLQ diagonal head movements, min " sway, SBA  4 x 100 feet forward <> backward ambulation with ball toss/catch, SBA      Patient Education and Home Exercises     Home Exercises Provided and Patient Education Provided     Education provided:   - role of Physical Therapy, vestibular pathology, HEP    Written Home Exercises Provided: yes. Exercises were reviewed and Fior was able to demonstrate them prior to the end of the session.  Fior demonstrated good  understanding of the education provided. See EMR under Patient Instructions for exercises provided during therapy session on 07/18/22.    ASSESSMENT     Fior tolerated therapy session well this afternoon. No increase in symptoms with hallway walking or balance activities. Updated home exercise program with BOSU split stance since patient has this equipment available at [employer]. She continues to demonstrate good progress with balance activities.  She will continue to benefit from skilled Physical Therapy to address limitations and maximize functional mobility.     Fior Is progressing well towards her goals.   Pt prognosis is Good.     Pt will continue to benefit from skilled outpatient physical therapy to address the deficits listed in the problem list box on initial evaluation, provide pt/family education and to maximize pt's level of independence in the home and community environment.     Pt's spiritual, cultural and educational needs considered and pt agreeable to plan of care and goals.     Anticipated barriers to physical therapy: none    Goals:  Short Term Goals: 4 weeks   1. Patient to be (I) with established home exercise program. on going   2. Patient to pass GST condition 2 with no additional sway for improved balance in low vision environments. on going   3. Patient to improve GST condition 3 to at least 15 seconds for improved balance and decreased fall risk. on going   4. Patient to improve GST condition 6 to at least 20 seconds for improved balance in low vision environments. on  going      Long Term Goals: 8 weeks   1. Patient to be (I) with advanced home exercise program. on going   2. Patient to improve GST condition 3 to at least 20 seconds for improved balance and decreased fall risk. on going   3. Patient to improve GST condition 6 to at least 30 seconds for improved balance in low vision environments. on going   4. Patient to improve Functional Gait Assessment score to at least 26/30 for improved balance in community environments.  on going     PLAN     Progress standing balance, initiate motion tolerance    Tamiko Sandoval, PT

## 2022-08-08 NOTE — PROGRESS NOTES
"[facility]   Physical Therapy Treatment Note     Name: Fior Moreland  Clinic Number: [id number]    Therapy Diagnosis:   Encounter Diagnoses   Name Primary?    Dizziness Yes    Impairment of balance      Physician: August Batista MD    Visit Date: 8/9/2022    Physician Orders: PT Eval and Treat   Medical Diagnosis from Referral: Vertigo  Evaluation Date: 7/7/2022  Authorization Period Expiration: 07/07/22 to 12/31/22  Plan of Care Expiration: 09/02/22  Visit # / Visits authorized: 07/ 20    PTA Visit #: 0/5     Time In: 12:45  Time Out: 13:15  Total Billable Time: 30 minutes    SUBJECTIVE     Pt reports: She is doing fine this afternoon.  Response to previous treatment: no adverse effects  Functional change: on going    Pain: 0/10  Location: denies any pain    OBJECTIVE     Objective Measures updated at progress report unless specified.     Treatment     Fior received the treatments listed below:      neuromuscular re-education activities to improve: Balance, proprioception for 30 minutes. The following activities were included:    // bars:    X 4 laps tandem ambulation in // bars, CGA, occ touchdown support    On airex pad, CGA   Feet together eyes closed 30 sec x 3, no UE support   Feet together with head rotation right/left 2 x 30 sec, no UE support   Feet together with head nods up/down 2 x 30 sec    3 x 30" each lower extremity, tandem stance with eyes open, SBA, occ touchdown support    2 X 10 reps, each lower extremity single leg stance with alternate lower extremity tapping 2 large cones in front, SBA, occ touchdown support           Ambulation with head movements:  2 x 100 feet right<>L left head movements, slight sway, SBA  2 x 100 feet up <> down head movements, slight sway, SBA  2 x 100 feet RUQ <> LLQ diagonal head movements, min sway, SBA  2 x 100 feet LUQ <> RLQ diagonal head movements, min sway, SBA      Patient Education and Home Exercises     Home Exercises " Provided and Patient Education Provided     Education provided:   - role of Physical Therapy, vestibular pathology, HEP    Written Home Exercises Provided: yes. Exercises were reviewed and Fior was able to demonstrate them prior to the end of the session.  Fior demonstrated good  understanding of the education provided. See EMR under Patient Instructions for exercises provided during therapy session on 07/18/22.    ASSESSMENT     Fior tolerated therapy session well this afternoon. No increase in symptoms with hallway walking or balance activities. Able to progress difficulty of balance activities with cone tapping activity.  She will continue to benefit from skilled Physical Therapy to address limitations and maximize functional mobility.     Fior Is progressing well towards her goals.   Pt prognosis is Good.     Pt will continue to benefit from skilled outpatient physical therapy to address the deficits listed in the problem list box on initial evaluation, provide pt/family education and to maximize pt's level of independence in the home and community environment.     Pt's spiritual, cultural and educational needs considered and pt agreeable to plan of care and goals.     Anticipated barriers to physical therapy: none    Goals:  Short Term Goals: 4 weeks   1. Patient to be (I) with established home exercise program. on going   2. Patient to pass GST condition 2 with no additional sway for improved balance in low vision environments. on going   3. Patient to improve GST condition 3 to at least 15 seconds for improved balance and decreased fall risk. on going   4. Patient to improve GST condition 6 to at least 20 seconds for improved balance in low vision environments. on going      Long Term Goals: 8 weeks   1. Patient to be (I) with advanced home exercise program. on going   2. Patient to improve GST condition 3 to at least 20 seconds for improved balance and decreased fall risk. on going   3. Patient to  improve GST condition 6 to at least 30 seconds for improved balance in low vision environments. on going   4. Patient to improve Functional Gait Assessment score to at least 26/30 for improved balance in community environments.  on going     PLAN     Progress standing balance, initiate motion tolerance    Tamiko Sandoval, PT

## 2022-08-09 ENCOUNTER — CLINICAL SUPPORT (OUTPATIENT)
Dept: REHABILITATION | Facility: HOSPITAL | Age: 67
End: 2022-08-09
Payer: MEDICARE

## 2022-08-09 DIAGNOSIS — R26.89 IMPAIRMENT OF BALANCE: ICD-10-CM

## 2022-08-09 DIAGNOSIS — R42 DIZZINESS: Primary | ICD-10-CM

## 2022-08-09 PROCEDURE — 97112 NEUROMUSCULAR REEDUCATION: CPT | Mod: PO

## 2022-08-15 NOTE — PROGRESS NOTES
"[facility]   Physical Therapy Treatment Note     Name: Fior Moreland  Clinic Number: [id number]    Therapy Diagnosis:   Encounter Diagnoses   Name Primary?    Dizziness Yes    Impairment of balance      Physician: August Batista MD    Visit Date: 8/16/2022    Physician Orders: PT Eval and Treat   Medical Diagnosis from Referral: Vertigo  Evaluation Date: 7/7/2022  Authorization Period Expiration: 07/07/22 to 12/31/22  Plan of Care Expiration: 09/02/22  Visit # / Visits authorized: 08/ 20    PTA Visit #: 0/5     Time In: 12:30  Time Out: 13:10  Total Billable Time: 40 minutes    SUBJECTIVE     Pt reports: She is doing fine this afternoon. She has had a busy week dealing with her 's health issues and recent hospitalization.   Response to previous treatment: no adverse effects  Functional change: on going    Pain: 0/10  Location: denies any pain    OBJECTIVE     Objective Measures updated at progress report unless specified.     Treatment     Fior received the treatments listed below:      neuromuscular re-education activities to improve: Balance, proprioception for 40 minutes. The following activities were included:    // bars:    LIAN SENSORY TESTING:  (P= Pass, F= Fail; note any sway; hold each position for 30")  Condition 1: (firm surface/feet together/eyes open) P  Condition 2: (firm surface/feet together/eyes closed) P  Condition 3: (firm surface/feet in tandem/eyes open) F, 16 sec c/ LLE in front; P c/ RLE in front- mod sway  Condition 4: (firm surface/feet in tandem/eyes closed) NT this date due to safety concerns  Condition 5: (soft surface/feet together/eyes open) P  Condition 6: (soft surface/feet together/eyes closed) P- min sway  Condition 7: (Fukuda step test), measure distance varied from center starting position, > 30 deg deviation to either side indicates hypofunction of biased side NT this date due to difficulty with vision eliminated " conditions     Functional Gait Assessment:   1. Gait on level surface =  3  2. Change in Gait Speed = 3  3. Gait with horizontal head turns  = 2  4. Gait with vertical head turns = 3  5. Gait with pivot turns = 3  6. Step over obstacle = 3  7. Gait with Narrow BECCA = 1  8. Gait with eyes closed = 2  9. Ambulating Backwards = 2  10. Steps = 3      Score: 25/30  Evaluation: 22/30   Score:   <22/30 fall risk   <20/30 fall risk in older adults   <18/30 fall risk in Parkinsons     FOTO survey: 41 % limitation    X 4 laps tandem ambulation in // bars, SBA occ touchdown support    Ambulation with head movements:  2 x 100 feet right<>L left head movements, slight sway, SBA  2 x 100 feet up <> down head movements, slight sway, SBA  2 x 100 feet RUQ <> LLQ diagonal head movements, min sway, SBA  2 x 100 feet LUQ <> RLQ diagonal head movements, min sway, SBA  2 x 100 feet forward <> backward ambulation in hallway tossing <> catching ball, SBA      Patient Education and Home Exercises     Home Exercises Provided and Patient Education Provided     Education provided:   - role of Physical Therapy, vestibular pathology, HEP    Written Home Exercises Provided: yes. Exercises were reviewed and [place] was able to demonstrate them prior to the end of the session.  Fior demonstrated good  understanding of the education provided. See EMR under Patient Instructions for exercises provided during therapy session on 07/18/22.    ASSESSMENT     Patient reassessed to determine progress with therapy. Reassessment period: 07/07/22 to 08/16/22. Patient demonstrates good progress with therapy, with improvements noted with motion tolerance and overall balance. Performance on GST improved from evaluation. Most difficulty still noted on tandem stance and vision eliminated stance on foam pad.  Current performance on Functional Gait Assessment also improved from evaluation with current score placing patient out of elevated fall risk category. Plan to  utilize remaining scheduled appointments to address remaining balance deficits and motion tolerance deficits in preparation for upcoming d/c.     Fior Is progressing well towards her goals.   Pt prognosis is Good.     Pt will continue to benefit from skilled outpatient physical therapy to address the deficits listed in the problem list box on initial evaluation, provide pt/family education and to maximize pt's level of independence in the home and community environment.     Pt's spiritual, cultural and educational needs considered and pt agreeable to plan of care and goals.     Anticipated barriers to physical therapy: none    Goals:  Short Term Goals: 4 weeks   1. Patient to be (I) with established home exercise program. MET 08/16/22  2. Patient to pass GST condition 2 with no additional sway for improved balance in low vision environments. MET 08/16/22  3. Patient to improve GST condition 3 to at least 15 seconds for improved balance and decreased fall risk. MET 08/16/22  4. Patient to improve GST condition 6 to at least 20 seconds for improved balance in low vision environments. MET 08/16/22     Long Term Goals: 8 weeks   1. Patient to be (I) with advanced home exercise program. on going   2. Patient to improve GST condition 3 to at least 20 seconds for improved balance and decreased fall risk. Partially Met  3. Patient to improve GST condition 6 to at least 30 seconds for improved balance in low vision environments. Progressing  4. Patient to improve Functional Gait Assessment score to at least 26/30 for improved balance in community environments. Progressing    PLAN     Progress balance conditions. Progress motion tolerance with bending down.     Tamiko Sandoval, PT

## 2022-08-16 ENCOUNTER — CLINICAL SUPPORT (OUTPATIENT)
Dept: REHABILITATION | Facility: HOSPITAL | Age: 67
End: 2022-08-16
Payer: MEDICARE

## 2022-08-16 DIAGNOSIS — R26.89 IMPAIRMENT OF BALANCE: ICD-10-CM

## 2022-08-16 DIAGNOSIS — R42 DIZZINESS: Primary | ICD-10-CM

## 2022-08-16 PROCEDURE — 97112 NEUROMUSCULAR REEDUCATION: CPT | Mod: PO

## 2022-08-18 ENCOUNTER — CLINICAL SUPPORT (OUTPATIENT)
Dept: REHABILITATION | Facility: HOSPITAL | Age: 67
End: 2022-08-18
Payer: MEDICARE

## 2022-08-18 DIAGNOSIS — R42 DIZZINESS: Primary | ICD-10-CM

## 2022-08-18 DIAGNOSIS — R26.89 IMPAIRMENT OF BALANCE: ICD-10-CM

## 2022-08-18 PROCEDURE — 97112 NEUROMUSCULAR REEDUCATION: CPT | Mod: PO

## 2022-08-18 NOTE — PROGRESS NOTES
"[facility]   Physical Therapy Treatment Note     Name: Fior Moreland  Clinic Number: [id number]    Therapy Diagnosis:   Encounter Diagnoses   Name Primary?    Dizziness Yes    Impairment of balance      Physician: August Batista MD    Visit Date: 8/18/2022    Physician Orders: PT Eval and Treat   Medical Diagnosis from Referral: Vertigo  Evaluation Date: 7/7/2022  Authorization Period Expiration: 07/07/22 to 12/31/22  Plan of Care Expiration: 09/02/22  Visit # / Visits authorized: 09/ 20    PTA Visit #: 0/5     Time In: 13:00  Time Out: 13:40  Total Billable Time: 40 minutes    SUBJECTIVE     Pt reports: She is doing fine this afternoon.   Response to previous treatment: no adverse effects  Functional change: on going    Pain: 0/10  Location: denies any pain    OBJECTIVE     Objective Measures updated at progress report unless specified.     Treatment     Fior received the treatments listed below:      neuromuscular re-education activities to improve: Balance, proprioception for 40 minutes. The following activities were included:    // bars:    X 4 laps tandem ambulation in // bars, SBA occ touchdown support    On airex pad, CGA              Feet together eyes closed 45 sec x 3, no UE support              Feet together with head rotation right/left 2 x 30 sec, no UE support              Feet together with head nods up/down 2 x 30 sec     3 x 30" each lower extremity, tandem stance with eyes open, CGA, occ touchdown support    Ambulation with head movements:  2 x 100 feet right<>L left head movements, slight sway, SBA  2 x 100 feet up <> down head movements, slight sway, SBA  2 x 100 feet RUQ <> LLQ diagonal head movements, min sway, SBA  2 x 100 feet LUQ <> RLQ diagonal head movements, min sway, SBA  4 x 100 feet forward <> backward ambulation in hallway tossing <> catching ball, SBA    [employer]: weight shifting training, plate 7  2 x 30" each lower extremity in front, tandem " stance, intermittent touchdown support    2 x 10 reps, each lower extremity single leg stance with alternate lower extremity tapping 2 large cones, CGA, occ touchdown support      Patient Education and Home Exercises     Home Exercises Provided and Patient Education Provided     Education provided:   - role of Physical Therapy, vestibular pathology, HEP    Written Home Exercises Provided: yes. Exercises were reviewed and Fior was able to demonstrate them prior to the end of the session.  Fior demonstrated good  understanding of the education provided. See EMR under Patient Instructions for exercises provided during therapy session on 07/18/22.    ASSESSMENT     Fior tolerated therapy session well this afternoon. Progressed duration of vision eliminated stance on foam pad for increased challenge. Used [employer] weight shifting training for improved visual feedback regarding appropriate weight shifting to maintain balance during tandem ambulation. Continue plan of care (POC) to further progress balance challenges.    Fior Is progressing well towards her goals.   Pt prognosis is Good.     Pt will continue to benefit from skilled outpatient physical therapy to address the deficits listed in the problem list box on initial evaluation, provide pt/family education and to maximize pt's level of independence in the home and community environment.     Pt's spiritual, cultural and educational needs considered and pt agreeable to plan of care and goals.     Anticipated barriers to physical therapy: none    Goals:  Short Term Goals: 4 weeks   1. Patient to be (I) with established home exercise program. MET 08/16/22  2. Patient to pass GST condition 2 with no additional sway for improved balance in low vision environments. MET 08/16/22  3. Patient to improve GST condition 3 to at least 15 seconds for improved balance and decreased fall risk. MET 08/16/22  4. Patient to improve GST condition 6 to at least 20 seconds for improved  balance in low vision environments. MET 08/16/22     Long Term Goals: 8 weeks   1. Patient to be (I) with advanced home exercise program. on going   2. Patient to improve GST condition 3 to at least 20 seconds for improved balance and decreased fall risk. Partially Met  3. Patient to improve GST condition 6 to at least 30 seconds for improved balance in low vision environments. Progressing  4. Patient to improve Functional Gait Assessment score to at least 26/30 for improved balance in community environments. Progressing    PLAN     Progress balance conditions. Progress motion tolerance with bending down.     Tamiko Sandoval, PT

## 2022-08-22 NOTE — PROGRESS NOTES
"[facility]   Physical Therapy Treatment Note     Name: Fior Moreland  Clinic Number: [id number]    Therapy Diagnosis:   Encounter Diagnoses   Name Primary?    Dizziness Yes    Impairment of balance      Physician: August Batista MD    Visit Date: 8/23/2022    Physician Orders: PT Eval and Treat   Medical Diagnosis from Referral: Vertigo  Evaluation Date: 7/7/2022  Authorization Period Expiration: 07/07/22 to 12/31/22  Plan of Care Expiration: 09/02/22  Visit # / Visits authorized: 10/ 20    PTA Visit #: 0/5     Time In: 12:30  Time Out: 13:10  Total Billable Time: 40 minutes    SUBJECTIVE     Pt reports: She is doing fine this afternoon.   Response to previous treatment: no adverse effects  Functional change: on going    Pain: 0/10  Location: denies any pain    OBJECTIVE     Objective Measures updated at progress report unless specified.     Treatment     Fior received the treatments listed below:      neuromuscular re-education activities to improve: Balance, proprioception for 40 minutes. The following activities were included:    // bars:    X 4 laps tandem ambulation in // bars, SBA occ touchdown support    On airex pad, CGA              Feet together eyes closed 45 sec x 3, no UE support              Feet together with head rotation right/left 2 x 30 sec, no UE support              Feet together with head nods up/down 2 x 30 sec     Ambulation with head movements:  2 x 100 feet right<>L left head movements, slight sway, SBA  2 x 100 feet up <> down head movements, slight sway, SBA  2 x 100 feet RUQ <> LLQ diagonal head movements, min sway, SBA  2 x 100 feet LUQ <> RLQ diagonal head movements, min sway, SBA  4 x 100 feet forward <> backward ambulation in hallway tossing <> catching ball, SBA    [employer]: weight shifting training, plate 7  3 x 30" each lower extremity in front, tandem stance, intermittent touchdown support    2 x 10 reps, each lower extremity single leg " "stance with alternate lower extremity tapping 2 large cones, CGA, occ touchdown support    2 x 30" each lower extremity single leg stance with alternate lower extremity on fitter board c/ eyes closed, CGA, occ touchdown support      Patient Education and Home Exercises     Home Exercises Provided and Patient Education Provided     Education provided:   - role of Physical Therapy, vestibular pathology, HEP    Written Home Exercises Provided: yes. Exercises were reviewed and Fior was able to demonstrate them prior to the end of the session.  Fior demonstrated good  understanding of the education provided. See EMR under Patient Instructions for exercises provided during therapy session on 07/18/22.    ASSESSMENT     Fior tolerated therapy session well this afternoon. Continued to use [employer] weight shifting training for improved visual feedback regarding appropriate weight shifting to maintain balance during tandem ambulation. Continue plan of care (POC) to further progress balance challenges.    Fior Is progressing well towards her goals.   Pt prognosis is Good.     Pt will continue to benefit from skilled outpatient physical therapy to address the deficits listed in the problem list box on initial evaluation, provide pt/family education and to maximize pt's level of independence in the home and community environment.     Pt's spiritual, cultural and educational needs considered and pt agreeable to plan of care and goals.     Anticipated barriers to physical therapy: none    Goals:  Short Term Goals: 4 weeks   1. Patient to be (I) with established home exercise program. MET 08/16/22  2. Patient to pass GST condition 2 with no additional sway for improved balance in low vision environments. MET 08/16/22  3. Patient to improve GST condition 3 to at least 15 seconds for improved balance and decreased fall risk. MET 08/16/22  4. Patient to improve GST condition 6 to at least 20 seconds for improved balance in low " vision environments. MET 08/16/22     Long Term Goals: 8 weeks   1. Patient to be (I) with advanced home exercise program. on going   2. Patient to improve GST condition 3 to at least 20 seconds for improved balance and decreased fall risk. Partially Met  3. Patient to improve GST condition 6 to at least 30 seconds for improved balance in low vision environments. Progressing  4. Patient to improve Functional Gait Assessment score to at least 26/30 for improved balance in community environments. Progressing    PLAN     Progress balance conditions. Progress motion tolerance with bending down.     Tamiko Sandoval, PT

## 2022-08-23 ENCOUNTER — CLINICAL SUPPORT (OUTPATIENT)
Dept: REHABILITATION | Facility: HOSPITAL | Age: 67
End: 2022-08-23
Payer: MEDICARE

## 2022-08-23 DIAGNOSIS — R26.89 IMPAIRMENT OF BALANCE: ICD-10-CM

## 2022-08-23 DIAGNOSIS — R42 DIZZINESS: Primary | ICD-10-CM

## 2022-08-23 PROCEDURE — 97112 NEUROMUSCULAR REEDUCATION: CPT | Mod: PO

## 2022-08-29 NOTE — PROGRESS NOTES
"[facility]   Physical Therapy Treatment Note     Name: Fior Moreland  Clinic Number: [id number]    Therapy Diagnosis:   Encounter Diagnoses   Name Primary?    Dizziness Yes    Impairment of balance      Physician: August Batista MD    Visit Date: 8/30/2022    Physician Orders: PT Eval and Treat   Medical Diagnosis from Referral: Vertigo  Evaluation Date: 7/7/2022  Authorization Period Expiration: 07/07/22 to 12/31/22  Plan of Care Expiration: 09/02/22  Visit # / Visits authorized: 11/ 20    PTA Visit #: 0/5     Time In: 12:33  Time Out: 13:15  Total Billable Time: 42 minutes    SUBJECTIVE     Pt reports: She is doing fine this afternoon.   Response to previous treatment: no adverse effects  Functional change: on going    Pain: 0/10  Location: denies any pain    OBJECTIVE     Objective Measures updated at progress report unless specified.     Treatment     Fior received the treatments listed below:      neuromuscular re-education activities to improve: Balance, proprioception for 42 minutes. The following activities were included:    // bars:    X 4 laps tandem ambulation in // bars, SBA occ touchdown support    On airex pad, CGA              Feet together eyes closed 45 sec x 3, no UE support              Feet together with head rotation right/left 2 x 30 sec, no UE support              Feet together with head nods up/down 2 x 30 sec     Ambulation with head movements:  2 x 100 feet right<>L left head movements, slight sway, SBA  2 x 100 feet up <> down head movements, slight sway, SBA  2 x 100 feet RUQ <> LLQ diagonal head movements, min sway, SBA  2 x 100 feet LUQ <> RLQ diagonal head movements, min sway, SBA  4 x 100 feet forward <> backward ambulation in hallway tossing <> catching ball, SBA    [employer]: weight shifting training, plate 7  3 x 30" each lower extremity in front, tandem stance, intermittent touchdown support    2 x 10 reps, each lower extremity single leg stance " "with alternate lower extremity tapping 2 large cones, CGA, occ touchdown support    2 x 30" each lower extremity single leg stance with alternate lower extremity on fitter board c/ eyes closed, CGA, occ touchdown support      Patient Education and Home Exercises     Home Exercises Provided and Patient Education Provided     Education provided:   - role of Physical Therapy, vestibular pathology, HEP    Written Home Exercises Provided: yes. Exercises were reviewed and F[place] was able to demonstrate them prior to the end of the session.  Fior demonstrated good  understanding of the education provided. See EMR under Patient Instructions for exercises provided during therapy session on 07/18/22.    ASSESSMENT     Fior tolerated therapy session well this afternoon. Steady improvement noted with balance performance today. Continued to use [employer] weight shifting training for improved visual feedback regarding appropriate weight shifting to maintain balance during tandem ambulation. D/c next session.    Fior Is progressing well towards her goals.   Pt prognosis is Good.     Pt will continue to benefit from skilled outpatient physical therapy to address the deficits listed in the problem list box on initial evaluation, provide pt/family education and to maximize pt's level of independence in the home and community environment.     Pt's spiritual, cultural and educational needs considered and pt agreeable to plan of care and goals.     Anticipated barriers to physical therapy: none    Goals:  Short Term Goals: 4 weeks   1. Patient to be (I) with established home exercise program. MET 08/16/22  2. Patient to pass GST condition 2 with no additional sway for improved balance in low vision environments. MET 08/16/22  3. Patient to improve GST condition 3 to at least 15 seconds for improved balance and decreased fall risk. MET 08/16/22  4. Patient to improve GST condition 6 to at least 20 seconds for improved balance in low " vision environments. MET 08/16/22     Long Term Goals: 8 weeks   1. Patient to be (I) with advanced home exercise program. on going   2. Patient to improve GST condition 3 to at least 20 seconds for improved balance and decreased fall risk. Partially Met  3. Patient to improve GST condition 6 to at least 30 seconds for improved balance in low vision environments. Progressing  4. Patient to improve Functional Gait Assessment score to at least 26/30 for improved balance in community environments. Progressing    PLAN     D/c next session.     Tamiko Sandoval, PT

## 2022-08-30 ENCOUNTER — CLINICAL SUPPORT (OUTPATIENT)
Dept: REHABILITATION | Facility: HOSPITAL | Age: 67
End: 2022-08-30
Attending: STUDENT IN AN ORGANIZED HEALTH CARE EDUCATION/TRAINING PROGRAM
Payer: MEDICARE

## 2022-08-30 DIAGNOSIS — R42 DIZZINESS: Primary | ICD-10-CM

## 2022-08-30 DIAGNOSIS — R26.89 IMPAIRMENT OF BALANCE: ICD-10-CM

## 2022-08-30 PROCEDURE — 97112 NEUROMUSCULAR REEDUCATION: CPT | Mod: PO

## 2022-09-01 ENCOUNTER — CLINICAL SUPPORT (OUTPATIENT)
Dept: REHABILITATION | Facility: HOSPITAL | Age: 67
End: 2022-09-01
Attending: STUDENT IN AN ORGANIZED HEALTH CARE EDUCATION/TRAINING PROGRAM
Payer: MEDICARE

## 2022-09-01 DIAGNOSIS — R42 DIZZINESS: Primary | ICD-10-CM

## 2022-09-01 DIAGNOSIS — R26.89 IMPAIRMENT OF BALANCE: ICD-10-CM

## 2022-09-01 PROCEDURE — 97112 NEUROMUSCULAR REEDUCATION: CPT | Mod: PO

## 2022-09-01 NOTE — PATIENT INSTRUCTIONS
Resource: [employer]     Perform 2 laps over ~90 foot walkway for each of the following movements.Perform with eyes open.   Right <> left head movements  Up <> down head movement  Right upper <> left lower diagonal head movements  Left upper <> right lower diagonal head movements

## 2022-09-01 NOTE — PROGRESS NOTES
"[facility]   Physical Therapy Treatment Note     Name: Fior Moreland  Clinic Number: [id number]    Therapy Diagnosis:   Encounter Diagnoses   Name Primary?    Dizziness Yes    Impairment of balance        Physician: August Batista MD    Visit Date: 9/1/2022    Physician Orders: PT Eval and Treat   Medical Diagnosis from Referral: Vertigo  Evaluation Date: 7/7/2022  Authorization Period Expiration: 07/07/22 to 12/31/22  Plan of Care Expiration: 09/02/22  Visit # / Visits authorized: 12/ 20    PTA Visit #: 0/5     Time In: 13:00  Time Out: 13:35  Total Billable Time: 35 minutes    SUBJECTIVE     Pt reports: She is doing fine this afternoon.   Response to previous treatment: no adverse effects  Functional change: on going    Pain: 0/10  Location: denies any pain    OBJECTIVE     Objective Measures updated at progress report unless specified.     Treatment     Fior received the treatments listed below:      neuromuscular re-education activities to improve: Balance, proprioception for 35 minutes. The following activities were included:    LIAN SENSORY TESTING:  (P= Pass, F= Fail; note any sway; hold each position for 30")  Condition 1: (firm surface/feet together/eyes open) P  Condition 2: (firm surface/feet together/eyes closed) P  Condition 3: (firm surface/feet in tandem/eyes open) P sec c/ LLE in front- min sway; P c/ RLE in front- mod sway  Condition 4: (firm surface/feet in tandem/eyes closed) NT this date due to safety concerns  Condition 5: (soft surface/feet together/eyes open) P  Condition 6: (soft surface/feet together/eyes closed) P- slight sway  Condition 7: (Fukuda step test), measure distance varied from center starting position, > 30 deg deviation to either side indicates hypofunction of biased side NT this date due to difficulty with vision eliminated conditions     Functional Gait Assessment:   1. Gait on level surface =  3  2. Change in Gait Speed = 3  3. Gait with " "horizontal head turns  = 3  4. Gait with vertical head turns = 3  5. Gait with pivot turns = 3  6. Step over obstacle = 3  7. Gait with Narrow BECCA = 1  8. Gait with eyes closed = 2  9. Ambulating Backwards = 3  10. Steps = 3      Score: 27/30 08/16/22: 25/30  Evaluation: 22/30   Score:   <22/30 fall risk   <20/30 fall risk in older adults   <18/30 fall risk in Parkinsons      FOTO survey: 49 % limitation    Updated home exercise program:  Ambulation with head movements:  2 x 100 feet right<>L left head movements, slight sway, SBA  2 x 100 feet up <> down head movements, slight sway, SBA  2 x 100 feet RUQ <> LLQ diagonal head movements, min sway, SBA  2 x 100 feet LUQ <> RLQ diagonal head movements, min sway, SBA    Corner of room: supervision throughout  X 30" each lower extremity in front, tandem stance with eyes open, occ touchdown support  2 x 30" rhomberg stance with eyes closed, no upper extremity support      Patient Education and Home Exercises     Home Exercises Provided and Patient Education Provided     Education provided:   - d/c recommendations    Written Home Exercises Provided: yes. Exercises were reviewed and Fior was able to demonstrate them prior to the end of the session.  Fior demonstrated good  understanding of the education provided. See EMR under Patient Instructions for exercises provided during therapy session on 07/18/22 and 09/01/22.     PHYSICAL THERAPY DISCHARGE SUMMARY   Total Visits:12  Missed Visits:0  Cancelled Visits: 1    Status Towards Goals Met: Patient demonstrates great progress meeting 4/4 STGs and 4/4LTGs. Improvements noted with motion tolerance and overall balance. Patient able to pass all tested conditions of GST with greatest improvement noted with vision eliminated conditions and tandem stance. Current Functional Gait Assessment score places patient out of elevated fall risk category with 5 point improvement noted throughout course of therapy. No significant recent " episodes of dizziness with daily activities. Updated home exercise program reviewed to maintain static balance on independent home basis. Recommend keeping ENT appointment scheduled for October to establish care in event further vertigo episodes occur. Patient has met goals and maximized benefit form vestibular rehab at this time, and is appropriate for d/c.    Goals:  Short Term Goals: 4 weeks   1. Patient to be (I) with established home exercise program. MET 08/16/22  2. Patient to pass GST condition 2 with no additional sway for improved balance in low vision environments. MET 08/16/22  3. Patient to improve GST condition 3 to at least 15 seconds for improved balance and decreased fall risk. MET 08/16/22  4. Patient to improve GST condition 6 to at least 20 seconds for improved balance in low vision environments. MET 08/16/22     Long Term Goals: 8 weeks   1. Patient to be (I) with advanced home exercise program. MET 09/01/22  2. Patient to improve GST condition 3 to at least 20 seconds for improved balance and decreased fall risk. MET 09/01/22  3. Patient to improve GST condition 6 to at least 30 seconds for improved balance in low vision environments. MET 09/01/22  4. Patient to improve Functional Gait Assessment score to at least 26/30 for improved balance in community environments. MET 09/01/2    Goals Not achieved and why:   Patient has met all established goals.       Discharge reason : Met goals and Patient has maximized benefit from PT at this time    PLAN   This patient is discharged from Outpatient Physical Therapy Services.     Tamiko Sandoval, PT  09/01/2022

## 2022-11-03 ENCOUNTER — OFFICE VISIT (OUTPATIENT)
Dept: OTOLARYNGOLOGY | Facility: CLINIC | Age: 67
End: 2022-11-03
Payer: MEDICARE

## 2022-11-03 ENCOUNTER — CLINICAL SUPPORT (OUTPATIENT)
Dept: OTOLARYNGOLOGY | Facility: CLINIC | Age: 67
End: 2022-11-03
Payer: MEDICARE

## 2022-11-03 VITALS
WEIGHT: 126 LBS | BODY MASS INDEX: 21.63 KG/M2 | TEMPERATURE: 98 F | HEART RATE: 57 BPM | DIASTOLIC BLOOD PRESSURE: 72 MMHG | SYSTOLIC BLOOD PRESSURE: 119 MMHG

## 2022-11-03 DIAGNOSIS — R42 DIZZINESS: ICD-10-CM

## 2022-11-03 DIAGNOSIS — Z01.10 ENCOUNTER FOR HEARING EXAMINATION WITHOUT ABNORMAL FINDINGS: Primary | ICD-10-CM

## 2022-11-03 DIAGNOSIS — Z82.2 FAMILY HISTORY OF HEARING LOSS: ICD-10-CM

## 2022-11-03 DIAGNOSIS — H83.09 VIRAL LABYRINTHITIS, UNSPECIFIED LATERALITY: ICD-10-CM

## 2022-11-03 DIAGNOSIS — R42 VERTIGO: ICD-10-CM

## 2022-11-03 DIAGNOSIS — Z01.10 HEARING EXAM WITHOUT ABNORMAL FINDINGS: ICD-10-CM

## 2022-11-03 DIAGNOSIS — Z87.09 HISTORY OF URI (UPPER RESPIRATORY INFECTION): ICD-10-CM

## 2022-11-03 PROCEDURE — 92550 TYMPANOMETRY & REFLEX THRESH: CPT | Mod: S$GLB,,, | Performed by: AUDIOLOGIST-HEARING AID FITTER

## 2022-11-03 PROCEDURE — 92557 PR COMPREHENSIVE HEARING TEST: ICD-10-PCS | Mod: S$GLB,,, | Performed by: AUDIOLOGIST-HEARING AID FITTER

## 2022-11-03 PROCEDURE — 92557 COMPREHENSIVE HEARING TEST: CPT | Mod: S$GLB,,, | Performed by: AUDIOLOGIST-HEARING AID FITTER

## 2022-11-03 PROCEDURE — 92550 PR TYMPANOMETRY AND REFLEX THRESHOLD MEASUREMENTS: ICD-10-PCS | Mod: S$GLB,,, | Performed by: AUDIOLOGIST-HEARING AID FITTER

## 2022-11-03 PROCEDURE — 99204 PR OFFICE/OUTPT VISIT, NEW, LEVL IV, 45-59 MIN: ICD-10-PCS | Mod: S$GLB,,, | Performed by: OTOLARYNGOLOGY

## 2022-11-03 PROCEDURE — 99204 OFFICE O/P NEW MOD 45 MIN: CPT | Mod: S$GLB,,, | Performed by: OTOLARYNGOLOGY

## 2022-11-03 RX ORDER — MULTIVIT-MIN/FOLIC ACID/LUTEIN 400-250MCG
TABLET,CHEWABLE ORAL
COMMUNITY

## 2022-11-03 RX ORDER — ACETAMINOPHEN 500 MG
TABLET ORAL
COMMUNITY

## 2022-11-03 NOTE — Clinical Note
Your patient, Fior Moreland, was recently seen for an audiogram.  My assessment and recommendations are enclosed.  If you should have any questions or concerns, please contact me at [phone].   Sincerely, Guy Luevano, CCC-A Audiologist [facility]

## 2022-11-04 NOTE — PROGRESS NOTES
Guy Luevano, CCC-A  Audiologist - [facility] [address] [place] [address]  [email or web address]  [phone]    Patient: Fior Moreland   MRN: [id number]  [address] [facility]  Home Phone [phone]   Work Phone Not on file.   Mobile [phone]   : 1955  GARZA: 11/3/2022      AUDIOLOGICAL EVALUATION      IMPRESSION:   Audiological testing indicated that Fior Moreland has normal hearing in both ears.    RECOMMENDATIONS:   It is recommended that she:  Follow up medically with Dr. Maradiaga to assess her dizziness.  Use precaution and/or hearing protection in noisy environments.    If you should have any questions or concerns regarding the above information, please do not hesitate to contact me at [phone].      _______________________________  Guy Luevano, ART-A  Audiologist

## 2022-11-06 NOTE — PROGRESS NOTES
Subjective:       Patient ID: Fior Moreland is a 67 y.o. female.    Chief Complaint: Dizziness    She is a new patient here today to discuss episode of vertigo.  Attended granddaughter's birthday party end of May of this year where several children had bad colds and many adults subsequently developed respiratory symptoms.  Multiple COVID tests were negative.  10-14 days later her mother developed vertigo and then patient as well within 3 days.  Initially severe lasting 30 minutes even if still.  Then disequilibrium and careful to avoid any quick moves and holding on to walk over the next several days.  No associated otologic, cardiac, neurologic symptoms otherwise.  Saw PCP and referred to physical therapy.  States symptoms had essentially resolved by then however.  Positive history of migraine which she has managed with diet, previously associated with aura, headache, dizziness but not in quite some time.        Review of Systems     Constitutional: Negative for fever.      HENT: Negative for ear discharge, ear pain, hearing loss, ringing in the ears, sore throat, stuffy nose, trouble swallowing and voice change.      Respiratory:  Negative for cough and shortness of breath.      Cardiovascular:  Negative for chest pain.     Gastrointestinal:  Negative for abdominal pain.     Neurological: Negative for headaches.      Hematologic: Negative for swollen glands.              Objective:        Vitals:    11/03/22 1159   BP: 119/72   Pulse: (!) 57   Temp: 97.9 °F (36.6 °C)     Body mass index is 21.63 kg/m².  Physical Exam  Constitutional:       General: She is not in acute distress.     Appearance: She is well-developed.   HENT:      Head: Normocephalic and atraumatic.      Right Ear: Tympanic membrane, ear canal and external ear normal.      Left Ear: Tympanic membrane, ear canal and external ear normal.      Nose: No nasal deformity, mucosal edema or rhinorrhea.      Mouth/Throat:      Mouth: Mucous membranes are  moist.      Pharynx: No pharyngeal swelling, oropharyngeal exudate or posterior oropharyngeal erythema.   Neck:      Trachea: Phonation normal.   Pulmonary:      Effort: Pulmonary effort is normal. No respiratory distress.   Musculoskeletal:      Cervical back: Neck supple.   Lymphadenopathy:      Cervical: No cervical adenopathy.   Skin:     General: Skin is warm and dry.   Neurological:      Mental Status: She is alert and oriented to person, place, and time.   Psychiatric:         Speech: Speech normal.         Behavior: Behavior normal.       Tests / Results:      Reviewed/discussed above audiogram which demonstrates symmetrical curves with normal thresholds of 25 dB or less and excellent speech discrimination of 96% bilaterally.        Assessment:       1. Vertigo    2. Hearing exam without abnormal findings    3. History of URI (upper respiratory infection)    4. Viral labyrinthitis, unspecified laterality          Plan:       Reviewed above is consistent with viral labyrinthitis, now resolved.  Recommend follow-up if symptoms or problems and okay to recheck audiogram 2 years unless change or problems prior.

## 2022-12-07 NOTE — PROGRESS NOTES
"[facility]   Physical Therapy Treatment Note     Name: Fior Moreland  Clinic Number: [id number]    Therapy Diagnosis:   Encounter Diagnoses   Name Primary?    Dizziness Yes    Impairment of balance      Physician: August Batista MD    Visit Date: 7/25/2022    Physician Orders: PT Eval and Treat   Medical Diagnosis from Referral: Vertigo  Evaluation Date: 7/7/2022  Authorization Period Expiration: 07/07/22 to 12/31/22  Plan of Care Expiration: 09/02/22  Visit # / Visits authorized: 03/ 20    PTA Visit #: 1/5     Time In: 3:30 PM   Time Out: 4:15 PM   Total Billable Time: 45 minutes    SUBJECTIVE     Pt reports: that her dizziness is better. Bending down is also better. After she finishes her exercises, her right ear was itching, but it did not happen today.   Response to previous treatment: felt fine  Functional change: on going    Pain: 0/10  Location: denies any pain    OBJECTIVE     Objective Measures updated at progress report unless specified.     Treatment     Fior received the treatments listed below:      neuromuscular re-education activities to improve: Balance, proprioception for 45 minutes. The following activities were included:      // bars:  On airex pad, CGA   Feet together eyes closed 30 sec x 3, occ touchdown support   Feet together with head rotation right/left 2 x 30 sec, occ touchdown support   Feet together with head nods up/down 2 x 30 sec    On rocker board               Lateral weight shifts 2 trials of 1 minute CGA Min A(COG deviated posteriorly               AP weight shifts in staggered stance right foot fwd CGA Min A               AP weight shifts in staggered stance left foot fwd CGA Min A      2 x 30" each lower extremity tandem stance, eyes open, intermittent touchdown support, CGA to SBA    Ambulation with head movements:  2 x 120 feet right<>L left head movements, slight sway, SBA  2 x 120 feet up <> down head movements, slight sway, SBA  2 x " 120 feet RUQ <> LLQ diagonal head movements, min sway, SBA  2 x 120 feet LUQ <> RLQ diagonal head movements, min sway, SBA  2 x 120 feet ambulation in hallway with vertical self ball toss/catch, SBA      Patient Education and Home Exercises     Home Exercises Provided and Patient Education Provided     Education provided:   - role of Physical Therapy, vestibular pathology, HEP    Written Home Exercises Provided: yes. Exercises were reviewed and Fior was able to demonstrate them prior to the end of the session.  Fior demonstrated good  understanding of the education provided. See EMR under Patient Instructions for exercises provided during therapy sessions    ASSESSMENT     Fior tolerated therapy session well this afternoon.  Balance well challenged with foam activities, tandem stance, and ambulation with head movements.  Fior exhibits min veering, inconsistent steps and slightly wandering path of progression with head movement during ambulation. She did struggle most with 2 point fitter balance interventions requiring single upper extremity support throughout due to lateral and posterior center of gravity deviation. Continue skilled Physical Therapy per PT plan of care .     Fior Is progressing well towards her goals.   Pt prognosis is Good.     Pt will continue to benefit from skilled outpatient physical therapy to address the deficits listed in the problem list box on initial evaluation, provide pt/family education and to maximize pt's level of independence in the home and community environment.     Pt's spiritual, cultural and educational needs considered and pt agreeable to plan of care and goals.     Anticipated barriers to physical therapy: none    Goals:  Short Term Goals: 4 weeks   1. Patient to be (I) with established home exercise program. on going   2. Patient to pass GST condition 2 with no additional sway for improved balance in low vision environments. on going   3. Patient to improve GST  condition 3 to at least 15 seconds for improved balance and decreased fall risk. on going   4. Patient to improve GST condition 6 to at least 20 seconds for improved balance in low vision environments. on going      Long Term Goals: 8 weeks   1. Patient to be (I) with advanced home exercise program. on going   2. Patient to improve GST condition 3 to at least 20 seconds for improved balance and decreased fall risk. on going   3. Patient to improve GST condition 6 to at least 30 seconds for improved balance in low vision environments. on going   4. Patient to improve Functional Gait Assessment score to at least 26/30 for improved balance in community environments.  on going     PLAN     Progress standing balance, initiate motion tolerance    Shaun Coley, PTA      Chest compression CPAP/T-Piece Resuscitator/Chest compression CPAP/Tactile Stimulation/T-Piece Resuscitator/Tracheal suctioning/Chest compression/Pulse Oximetry

## 2023-03-28 ENCOUNTER — TELEPHONE (OUTPATIENT)
Dept: INTERNAL MEDICINE | Facility: CLINIC | Age: 68
End: 2023-03-28
Payer: MEDICARE

## 2023-03-28 NOTE — TELEPHONE ENCOUNTER
----- Message from Zaria Parikh sent at 3/28/2023  9:57 AM CDT -----  Name of Who is Calling: TRI SULLIVAN [[id number]]              What is the request in detail: Patient requesting a call back to discuss scheduling appt              Can the clinic reply by [facility]: No              What Number to Call Back [facility]: [phone]

## 2023-03-29 ENCOUNTER — TELEPHONE (OUTPATIENT)
Dept: INTERNAL MEDICINE | Facility: CLINIC | Age: 68
End: 2023-03-29
Payer: MEDICARE

## 2023-03-29 NOTE — TELEPHONE ENCOUNTER
----- Message from Kaylyn Mooney sent at 3/28/2023  4:30 PM CDT -----  Regarding: missed call  Type:  Patient Returning Call    Who Called: Fior     Who Left Message for Patient: Delease    Does the patient know what this is regarding?: yes     Would the patient rather a call back or a response via [facility]? Either     Best Call Back Number:[phone]    Additional Information:  pt wantd to be scheduled on  July 5, 9, 12, 19, 26 mid day if possible.

## 2023-03-29 NOTE — TELEPHONE ENCOUNTER
Attempted to contact Ms. Moreland to confirm appt date and time on 07/12/23 @ 1000, But no answer.  LVM to call the office.

## 2023-07-12 ENCOUNTER — OFFICE VISIT (OUTPATIENT)
Dept: INTERNAL MEDICINE | Facility: CLINIC | Age: 68
End: 2023-07-12
Payer: MEDICARE

## 2023-07-12 ENCOUNTER — TELEPHONE (OUTPATIENT)
Dept: INTERNAL MEDICINE | Facility: CLINIC | Age: 68
End: 2023-07-12
Payer: MEDICARE

## 2023-07-12 VITALS
OXYGEN SATURATION: 96 % | SYSTOLIC BLOOD PRESSURE: 100 MMHG | BODY MASS INDEX: 21.68 KG/M2 | DIASTOLIC BLOOD PRESSURE: 70 MMHG | HEIGHT: 64 IN | HEART RATE: 56 BPM | WEIGHT: 127 LBS

## 2023-07-12 DIAGNOSIS — R07.9 CHEST PAIN, UNSPECIFIED TYPE: ICD-10-CM

## 2023-07-12 DIAGNOSIS — Z13.6 ENCOUNTER FOR LIPID SCREENING FOR CARDIOVASCULAR DISEASE: ICD-10-CM

## 2023-07-12 DIAGNOSIS — E78.1 HYPERTRIGLYCERIDEMIA: ICD-10-CM

## 2023-07-12 DIAGNOSIS — B35.1 ONYCHOMYCOSIS: ICD-10-CM

## 2023-07-12 DIAGNOSIS — Z80.3 FAMILY HISTORY OF BREAST CANCER: ICD-10-CM

## 2023-07-12 DIAGNOSIS — Z91.89 AT HIGH RISK FOR BREAST CANCER: ICD-10-CM

## 2023-07-12 DIAGNOSIS — R73.03 PRE-DIABETES: Primary | ICD-10-CM

## 2023-07-12 DIAGNOSIS — Z80.41 FAMILY HISTORY OF OVARIAN CANCER: ICD-10-CM

## 2023-07-12 DIAGNOSIS — Z12.31 ENCOUNTER FOR SCREENING MAMMOGRAM FOR MALIGNANT NEOPLASM OF BREAST: ICD-10-CM

## 2023-07-12 DIAGNOSIS — Z91.89 AT RISK FOR BLEEDING: ICD-10-CM

## 2023-07-12 DIAGNOSIS — D18.03 LIVER HEMANGIOMA: Chronic | ICD-10-CM

## 2023-07-12 DIAGNOSIS — Z13.220 ENCOUNTER FOR LIPID SCREENING FOR CARDIOVASCULAR DISEASE: ICD-10-CM

## 2023-07-12 DIAGNOSIS — M81.0 AGE-RELATED OSTEOPOROSIS WITHOUT CURRENT PATHOLOGICAL FRACTURE: ICD-10-CM

## 2023-07-12 PROBLEM — R26.89 IMPAIRMENT OF BALANCE: Status: RESOLVED | Noted: 2022-07-07 | Resolved: 2023-07-12

## 2023-07-12 PROBLEM — R42 DIZZINESS: Status: RESOLVED | Noted: 2022-07-07 | Resolved: 2023-07-12

## 2023-07-12 PROCEDURE — 99215 OFFICE O/P EST HI 40 MIN: CPT | Mod: S$PBB,,, | Performed by: STUDENT IN AN ORGANIZED HEALTH CARE EDUCATION/TRAINING PROGRAM

## 2023-07-12 PROCEDURE — 99213 OFFICE O/P EST LOW 20 MIN: CPT | Mod: PBBFAC | Performed by: STUDENT IN AN ORGANIZED HEALTH CARE EDUCATION/TRAINING PROGRAM

## 2023-07-12 PROCEDURE — 99215 PR OFFICE/OUTPT VISIT, EST, LEVL V, 40-54 MIN: ICD-10-PCS | Mod: S$PBB,,, | Performed by: STUDENT IN AN ORGANIZED HEALTH CARE EDUCATION/TRAINING PROGRAM

## 2023-07-12 PROCEDURE — 99999 PR PBB SHADOW E&M-EST. PATIENT-LVL III: ICD-10-PCS | Mod: PBBFAC,,, | Performed by: STUDENT IN AN ORGANIZED HEALTH CARE EDUCATION/TRAINING PROGRAM

## 2023-07-12 PROCEDURE — 99999 PR PBB SHADOW E&M-EST. PATIENT-LVL III: CPT | Mod: PBBFAC,,, | Performed by: STUDENT IN AN ORGANIZED HEALTH CARE EDUCATION/TRAINING PROGRAM

## 2023-07-12 RX ORDER — ALENDRONATE SODIUM 70 MG/1
70 TABLET ORAL
Qty: 12 TABLET | Refills: 3 | Status: SHIPPED | OUTPATIENT
Start: 2023-07-12 | End: 2023-12-07

## 2023-07-12 RX ORDER — CICLOPIROX 80 MG/ML
SOLUTION TOPICAL NIGHTLY
Qty: 6.6 ML | Refills: 1 | Status: SHIPPED | OUTPATIENT
Start: 2023-07-12

## 2023-07-12 NOTE — PROGRESS NOTES
"[facility]    Subjective:       Patient ID: Fior Moreland is a 67 y.o. female.    Chief Complaint: Annual Exam      History was obtained from the patient and supplemented through chart review.  This patient is known to me.     HPI:    Patient is a 67 y.o. female presents to for annual.    High risk of breast cancer  Monitored through [facility] in the past, now with [facility]  PGM  ovarian cancer  1st cousin with breast cancer  Was following with MRI, mammo at [facility],  mammo and US  Referral gyn    Vertigo- not an issue    HLD  Controlled with diet alone    Followed with Derm- [facility], Dr. Gallegos  Over due for returning  Onychomycosis- declines systemic treatment, sent in topical    Hx of incident with drinking, abdominal pain  Hepatic cysts found incidentally    Exercise: yoga at home, water aerobics at [employer]    osteoporosis  Centrum silver 1,000   Taking Vit D, ca and stopped fosamax due to chest pain  Agrees to restart     - "blew out his aortic valve in my living room"  But living  Prior teacher    Medical History  Past Medical History:   Diagnosis Date    1984       Review of Systems   Constitutional:  Negative for fever.   HENT:  Negative for trouble swallowing.    Respiratory:  Negative for shortness of breath.    Cardiovascular:  Positive for chest pain (with fosamax, stress with family).   Gastrointestinal:  Negative for constipation, diarrhea, nausea and vomiting.   Musculoskeletal:  Negative for gait problem.   Neurological:  Negative for seizures.   Psychiatric/Behavioral:  Negative for hallucinations.        Surgical hx, family hx, social hx   Have been reviewed      Current Outpatient Medications:     cholecalciferol, vitamin D3, 125 mcg (5,000 unit) Tab, , Disp: , Rfl:     mv-min-folic acid-lutein (CENTRUM SILVER) 400-250 mcg Chew, , Disp: , Rfl:     alendronate (FOSAMAX) 70 MG tablet, Take 1 tablet (70 mg total) by mouth every 7 days., Disp: 12 " "tablet, Rfl: 3    calcium carbonate-vitamin D3 600 mg-20 mcg (800 unit) Chew, , Disp: , Rfl:     ciclopirox (PENLAC) 8 % Soln, Apply topically nightly. Clean off with alcohol every 7 days, Disp: 6.6 mL, Rfl: 1    Objective:        Body mass index is 21.8 kg/m².  Vitals:    07/12/23 1005   BP: 100/70   Pulse: (!) 56   SpO2: 96%   Weight: 57.6 kg (126 lb 15.8 oz)   Height: 5' 4" (1.626 m)   PainSc:   5   PainLoc: Shoulder     Physical Exam  Vitals and nursing note reviewed.   Constitutional:       General: She is not in acute distress.     Appearance: Normal appearance. She is not ill-appearing.   HENT:      Head: Normocephalic and atraumatic.      Right Ear: Tympanic membrane, ear canal and external ear normal.      Left Ear: Tympanic membrane, ear canal and external ear normal.   Eyes:      General: No scleral icterus.  Cardiovascular:      Rate and Rhythm: Normal rate and regular rhythm.      Heart sounds: Normal heart sounds.   Pulmonary:      Effort: Pulmonary effort is normal.   Abdominal:      General: There is no distension.   Musculoskeletal:         General: No deformity.      Cervical back: Normal range of motion.   Skin:     General: Skin is warm and dry.   Neurological:      Mental Status: She is alert and oriented to person, place, and time.   Psychiatric:         Behavior: Behavior normal.         Lab Results   Component Value Date    WBC 6.03 07/08/2022    HGB 13.2 07/08/2022    HCT 40.9 07/08/2022     07/08/2022    CHOL 219 (H) 07/08/2022    TRIG 70 07/08/2022    HDL 74 07/08/2022    ALT 20 07/08/2022    AST 20 07/08/2022     07/08/2022    K 4.6 07/08/2022     07/08/2022    CREATININE 0.8 07/08/2022    BUN 17 07/08/2022    CO2 29 07/08/2022    TSH 1.956 07/08/2022    HGBA1C 5.5 07/08/2022       The 10-year ASCVD risk score (Bridget DE SOUZA, et al., 2019) is: 3.6%    Values used to calculate the score:      Age: 67 years      Sex: Female      Is Non- : No      " Diabetic: No      Tobacco smoker: No      Systolic Blood Pressure: 100 mmHg      Is BP treated: No      HDL Cholesterol: 74 mg/dL      Total Cholesterol: 219 mg/dL    (Imaging have been independently reviewed)    Reviewed prior mammos/mri    Assessment:         1. Pre-diabetes    2. Liver hemangioma    3. Encounter for lipid screening for cardiovascular disease    4. Hypertriglyceridemia    5. At risk for bleeding    6. Encounter for screening mammogram for malignant neoplasm of breast    7. Age-related osteoporosis without current pathological fracture    8. Chest pain, unspecified type    9. Onychomycosis    10. Family history of ovarian cancer    11. Family history of breast cancer    12. At high risk for breast cancer            Plan:     Fior was seen today for annual exam.    Diagnoses and all orders for this visit:    Pre-diabetes  -     Hemoglobin A1C; Future    Liver hemangioma  -     Comprehensive Metabolic Panel; Future    Encounter for lipid screening for cardiovascular disease  -     Lipid Panel; Future    Hypertriglyceridemia  -     Lipid Panel; Future  -     TSH; Future    At risk for bleeding  -     CBC Auto Differential; Future    Encounter for screening mammogram for malignant neoplasm of breast  -     Mammo Digital Screening Bilat; Future    Age-related osteoporosis without current pathological fracture  -     alendronate (FOSAMAX) 70 MG tablet; Take 1 tablet (70 mg total) by mouth every 7 days.    Chest pain, unspecified type  -     Stress Echo Which stress agent will be used? Treadmill Exercise; Color Flow Doppler? No; Future  -     SCHEDULED EKG 12-LEAD (to Muse); Future    Onychomycosis  -     ciclopirox (PENLAC) 8 % Soln; Apply topically nightly. Clean off with alcohol every 7 days    Family history of ovarian cancer  -     Ambulatory referral/consult to Obstetrics / Gynecology; Future    Family history of breast cancer  -     Ambulatory referral/consult to Obstetrics / Gynecology;  Future    At high risk for breast cancer          Health Maintenance  - Lipids:  6/15/2021 elevated TG  - A1C: 5.5 6/15/2021  - Colon Ca Screen: Cologuard neg 6/20/2021  - Immunizations: vaccinated; tetanus 5/2021; will get PNA with flu this fall    Women's health  - Pap: 6/19/2018? (pt states 2019), Dr. Romero  - Mammo: MRI screening/mammo 4/26/2022  - Dexa: osteoporosis 4/13/2022, taking fosamax   - Contraception: post-menopausal    Follow up in about 3 months (around 10/12/2023) for stress, chest pain.     or sooner prn    42 min were used in chart review, evaluation and counseling of patient with extensive communication regarding stressors, family history and phone call after visit to discuss referral to gyn, documentation and review of results on same day of service     All medications were reviewed including potential side effects and risks/benefits.  Pt was counseled to call back if anything worsens or if questions arise.    August Batista MD  Family Medicine  [facility]  [facility]  [address]  [place] [address]  Phone [phone]  Fax [phone]

## 2023-07-14 ENCOUNTER — LAB VISIT (OUTPATIENT)
Dept: LAB | Facility: OTHER | Age: 68
End: 2023-07-14
Attending: STUDENT IN AN ORGANIZED HEALTH CARE EDUCATION/TRAINING PROGRAM
Payer: MEDICARE

## 2023-07-14 DIAGNOSIS — R73.03 PRE-DIABETES: ICD-10-CM

## 2023-07-14 DIAGNOSIS — Z91.89 AT RISK FOR BLEEDING: ICD-10-CM

## 2023-07-14 DIAGNOSIS — E78.1 HYPERTRIGLYCERIDEMIA: ICD-10-CM

## 2023-07-14 DIAGNOSIS — Z13.220 ENCOUNTER FOR LIPID SCREENING FOR CARDIOVASCULAR DISEASE: ICD-10-CM

## 2023-07-14 DIAGNOSIS — Z13.6 ENCOUNTER FOR LIPID SCREENING FOR CARDIOVASCULAR DISEASE: ICD-10-CM

## 2023-07-14 DIAGNOSIS — D18.03 LIVER HEMANGIOMA: Chronic | ICD-10-CM

## 2023-07-14 LAB
ALBUMIN SERPL BCP-MCNC: 4.2 G/DL (ref 3.5–5.2)
ALP SERPL-CCNC: 73 U/L (ref 55–135)
ALT SERPL W/O P-5'-P-CCNC: 17 U/L (ref 10–44)
ANION GAP SERPL CALC-SCNC: 6 MMOL/L (ref 8–16)
AST SERPL-CCNC: 19 U/L (ref 10–40)
BASOPHILS # BLD AUTO: 0.06 K/UL (ref 0–0.2)
BASOPHILS NFR BLD: 0.9 % (ref 0–1.9)
BILIRUB SERPL-MCNC: 0.5 MG/DL (ref 0.1–1)
BUN SERPL-MCNC: 17 MG/DL (ref 8–23)
CALCIUM SERPL-MCNC: 10 MG/DL (ref 8.7–10.5)
CHLORIDE SERPL-SCNC: 105 MMOL/L (ref 95–110)
CHOLEST SERPL-MCNC: 208 MG/DL (ref 120–199)
CHOLEST/HDLC SERPL: 2.7 {RATIO} (ref 2–5)
CO2 SERPL-SCNC: 31 MMOL/L (ref 23–29)
CREAT SERPL-MCNC: 0.8 MG/DL (ref 0.5–1.4)
DIFFERENTIAL METHOD: ABNORMAL
EOSINOPHIL # BLD AUTO: 0.1 K/UL (ref 0–0.5)
EOSINOPHIL NFR BLD: 1.4 % (ref 0–8)
ERYTHROCYTE [DISTWIDTH] IN BLOOD BY AUTOMATED COUNT: 12.8 % (ref 11.5–14.5)
EST. GFR  (NO RACE VARIABLE): >60 ML/MIN/1.73 M^2
ESTIMATED AVG GLUCOSE: 111 MG/DL (ref 68–131)
GLUCOSE SERPL-MCNC: 91 MG/DL (ref 70–110)
HBA1C MFR BLD: 5.5 % (ref 4–5.6)
HCT VFR BLD AUTO: 42.6 % (ref 37–48.5)
HDLC SERPL-MCNC: 77 MG/DL (ref 40–75)
HDLC SERPL: 37 % (ref 20–50)
HGB BLD-MCNC: 13.8 G/DL (ref 12–16)
IMM GRANULOCYTES # BLD AUTO: 0.02 K/UL (ref 0–0.04)
IMM GRANULOCYTES NFR BLD AUTO: 0.3 % (ref 0–0.5)
LDLC SERPL CALC-MCNC: 118.6 MG/DL (ref 63–159)
LYMPHOCYTES # BLD AUTO: 2.2 K/UL (ref 1–4.8)
LYMPHOCYTES NFR BLD: 32.8 % (ref 18–48)
MCH RBC QN AUTO: 31.5 PG (ref 27–31)
MCHC RBC AUTO-ENTMCNC: 32.4 G/DL (ref 32–36)
MCV RBC AUTO: 97 FL (ref 82–98)
MONOCYTES # BLD AUTO: 0.5 K/UL (ref 0.3–1)
MONOCYTES NFR BLD: 7.3 % (ref 4–15)
NEUTROPHILS # BLD AUTO: 3.8 K/UL (ref 1.8–7.7)
NEUTROPHILS NFR BLD: 57.3 % (ref 38–73)
NONHDLC SERPL-MCNC: 131 MG/DL
NRBC BLD-RTO: 0 /100 WBC
PLATELET # BLD AUTO: 283 K/UL (ref 150–450)
PMV BLD AUTO: 10.3 FL (ref 9.2–12.9)
POTASSIUM SERPL-SCNC: 4.5 MMOL/L (ref 3.5–5.1)
PROT SERPL-MCNC: 7.1 G/DL (ref 6–8.4)
RBC # BLD AUTO: 4.38 M/UL (ref 4–5.4)
SODIUM SERPL-SCNC: 142 MMOL/L (ref 136–145)
TRIGL SERPL-MCNC: 62 MG/DL (ref 30–150)
TSH SERPL DL<=0.005 MIU/L-ACNC: 1.68 UIU/ML (ref 0.4–4)
WBC # BLD AUTO: 6.61 K/UL (ref 3.9–12.7)

## 2023-07-14 PROCEDURE — 83036 HEMOGLOBIN GLYCOSYLATED A1C: CPT | Performed by: STUDENT IN AN ORGANIZED HEALTH CARE EDUCATION/TRAINING PROGRAM

## 2023-07-14 PROCEDURE — 36415 COLL VENOUS BLD VENIPUNCTURE: CPT | Performed by: STUDENT IN AN ORGANIZED HEALTH CARE EDUCATION/TRAINING PROGRAM

## 2023-07-14 PROCEDURE — 80061 LIPID PANEL: CPT | Performed by: STUDENT IN AN ORGANIZED HEALTH CARE EDUCATION/TRAINING PROGRAM

## 2023-07-14 PROCEDURE — 80053 COMPREHEN METABOLIC PANEL: CPT | Performed by: STUDENT IN AN ORGANIZED HEALTH CARE EDUCATION/TRAINING PROGRAM

## 2023-07-14 PROCEDURE — 84443 ASSAY THYROID STIM HORMONE: CPT | Performed by: STUDENT IN AN ORGANIZED HEALTH CARE EDUCATION/TRAINING PROGRAM

## 2023-07-14 PROCEDURE — 85025 COMPLETE CBC W/AUTO DIFF WBC: CPT | Performed by: STUDENT IN AN ORGANIZED HEALTH CARE EDUCATION/TRAINING PROGRAM

## 2023-07-19 ENCOUNTER — HOSPITAL ENCOUNTER (OUTPATIENT)
Dept: RADIOLOGY | Facility: OTHER | Age: 68
Discharge: HOME | End: 2023-07-19
Attending: STUDENT IN AN ORGANIZED HEALTH CARE EDUCATION/TRAINING PROGRAM
Payer: MEDICARE

## 2023-07-19 DIAGNOSIS — Z12.31 ENCOUNTER FOR SCREENING MAMMOGRAM FOR MALIGNANT NEOPLASM OF BREAST: ICD-10-CM

## 2023-07-19 PROCEDURE — 77063 MAMMO DIGITAL SCREENING BILAT WITH TOMO: ICD-10-PCS | Mod: 26,,, | Performed by: RADIOLOGY

## 2023-07-19 PROCEDURE — 77067 MAMMO DIGITAL SCREENING BILAT WITH TOMO: ICD-10-PCS | Mod: 26,,, | Performed by: RADIOLOGY

## 2023-07-19 PROCEDURE — 77067 SCR MAMMO BI INCL CAD: CPT | Mod: 26,,, | Performed by: RADIOLOGY

## 2023-07-19 PROCEDURE — 77063 BREAST TOMOSYNTHESIS BI: CPT | Mod: 26,,, | Performed by: RADIOLOGY

## 2023-07-19 PROCEDURE — 77067 SCR MAMMO BI INCL CAD: CPT | Mod: TC

## 2023-07-27 ENCOUNTER — PES CALL (OUTPATIENT)
Dept: ADMINISTRATIVE | Facility: CLINIC | Age: 68
End: 2023-07-27
Payer: MEDICARE

## 2023-08-24 ENCOUNTER — TELEPHONE (OUTPATIENT)
Dept: INTERNAL MEDICINE | Facility: CLINIC | Age: 68
End: 2023-08-24
Payer: MEDICARE

## 2023-08-24 NOTE — TELEPHONE ENCOUNTER
----- Message from Meeta Feng sent at 8/24/2023 11:54 AM CDT -----  Name of Who is Calling: TRI SULLIVAN [[id number]]      What is the request in detail: pt  had some questions about her echo stress test if its chemical or treadmill. Please advise       Can the clinic reply by [facility]: No       What Number [facility] Back [facility]: [phone]

## 2023-08-28 ENCOUNTER — HOSPITAL ENCOUNTER (OUTPATIENT)
Dept: CARDIOLOGY | Facility: OTHER | Age: 68
Discharge: HOME | End: 2023-08-28
Attending: STUDENT IN AN ORGANIZED HEALTH CARE EDUCATION/TRAINING PROGRAM
Payer: MEDICARE

## 2023-08-28 VITALS
BODY MASS INDEX: 21.51 KG/M2 | DIASTOLIC BLOOD PRESSURE: 62 MMHG | WEIGHT: 126 LBS | HEART RATE: 57 BPM | HEIGHT: 64 IN | SYSTOLIC BLOOD PRESSURE: 111 MMHG

## 2023-08-28 DIAGNOSIS — R07.9 CHEST PAIN, UNSPECIFIED TYPE: ICD-10-CM

## 2023-08-28 LAB
BSA FOR ECHO PROCEDURE: 1.61 M2
CV ECHO LV RWT: 0.35 CM
CV STRESS BASE HR: 57 BPM
DIASTOLIC BLOOD PRESSURE: 62 MMHG
ECHO LV POSTERIOR WALL: 0.71 CM (ref 0.6–1.1)
FRACTIONAL SHORTENING: 30 % (ref 28–44)
INTERVENTRICULAR SEPTUM: 0.74 CM (ref 0.6–1.1)
LEFT INTERNAL DIMENSION IN SYSTOLE: 2.87 CM (ref 2.1–4)
LEFT VENTRICLE DIASTOLIC VOLUME INDEX: 45.8 ML/M2
LEFT VENTRICLE DIASTOLIC VOLUME: 73.73 ML
LEFT VENTRICLE MASS INDEX: 53 G/M2
LEFT VENTRICLE SYSTOLIC VOLUME INDEX: 19.5 ML/M2
LEFT VENTRICLE SYSTOLIC VOLUME: 31.4 ML
LEFT VENTRICULAR INTERNAL DIMENSION IN DIASTOLE: 4.09 CM (ref 3.5–6)
LEFT VENTRICULAR MASS: 85.14 G
OHS CV CPX 85 PERCENT MAX PREDICTED HEART RATE MALE: 126
OHS CV CPX ESTIMATED METS: 7
OHS CV CPX MAX PREDICTED HEART RATE: 148
OHS CV CPX PATIENT IS FEMALE: 1
OHS CV CPX PATIENT IS MALE: 0
OHS CV CPX PEAK DIASTOLIC BLOOD PRESSURE: 78 MMHG
OHS CV CPX PEAK HEAR RATE: 136 BPM
OHS CV CPX PEAK RATE PRESSURE PRODUCT: NORMAL
OHS CV CPX PEAK SYSTOLIC BLOOD PRESSURE: 128 MMHG
OHS CV CPX PERCENT MAX PREDICTED HEART RATE ACHIEVED: 92
OHS CV CPX RATE PRESSURE PRODUCT PRESENTING: 6669
STRESS ECHO POST EXERCISE DUR MIN: 6 MINUTES
STRESS ECHO POST EXERCISE DUR SEC: 3 SECONDS
SYSTOLIC BLOOD PRESSURE: 117 MMHG
Z-SCORE OF LEFT VENTRICULAR DIMENSION IN END DIASTOLE: -1.1
Z-SCORE OF LEFT VENTRICULAR DIMENSION IN END SYSTOLE: 0.1

## 2023-08-28 PROCEDURE — 93351 STRESS TTE COMPLETE: CPT | Mod: 26,,, | Performed by: INTERNAL MEDICINE

## 2023-08-28 PROCEDURE — 93010 EKG 12-LEAD: ICD-10-PCS | Mod: 59,,, | Performed by: INTERNAL MEDICINE

## 2023-08-28 PROCEDURE — 93010 ELECTROCARDIOGRAM REPORT: CPT | Mod: 59,,, | Performed by: INTERNAL MEDICINE

## 2023-08-28 PROCEDURE — 93351 STRESS ECHO (CUPID ONLY): ICD-10-PCS | Mod: 26,,, | Performed by: INTERNAL MEDICINE

## 2023-08-28 PROCEDURE — 93005 ELECTROCARDIOGRAM TRACING: CPT

## 2023-08-28 PROCEDURE — 93351 STRESS TTE COMPLETE: CPT

## 2023-09-12 ENCOUNTER — OFFICE VISIT (OUTPATIENT)
Dept: OBSTETRICS AND GYNECOLOGY | Facility: CLINIC | Age: 68
End: 2023-09-12
Payer: MEDICARE

## 2023-09-12 VITALS — SYSTOLIC BLOOD PRESSURE: 96 MMHG | DIASTOLIC BLOOD PRESSURE: 74 MMHG

## 2023-09-12 DIAGNOSIS — Z80.3 FAMILY HISTORY OF BREAST CANCER: ICD-10-CM

## 2023-09-12 DIAGNOSIS — Z80.41 FAMILY HISTORY OF OVARIAN CANCER: ICD-10-CM

## 2023-09-12 PROCEDURE — G0101 CA SCREEN;PELVIC/BREAST EXAM: HCPCS | Mod: PBBFAC | Performed by: STUDENT IN AN ORGANIZED HEALTH CARE EDUCATION/TRAINING PROGRAM

## 2023-09-12 PROCEDURE — G0101 PR CA SCREEN;PELVIC/BREAST EXAM: ICD-10-PCS | Mod: S$PBB,,, | Performed by: STUDENT IN AN ORGANIZED HEALTH CARE EDUCATION/TRAINING PROGRAM

## 2023-09-12 PROCEDURE — 99212 OFFICE O/P EST SF 10 MIN: CPT | Mod: PBBFAC,25 | Performed by: STUDENT IN AN ORGANIZED HEALTH CARE EDUCATION/TRAINING PROGRAM

## 2023-09-12 PROCEDURE — 99999 PR PBB SHADOW E&M-EST. PATIENT-LVL II: ICD-10-PCS | Mod: PBBFAC,,, | Performed by: STUDENT IN AN ORGANIZED HEALTH CARE EDUCATION/TRAINING PROGRAM

## 2023-09-12 PROCEDURE — G0101 CA SCREEN;PELVIC/BREAST EXAM: HCPCS | Mod: S$PBB,,, | Performed by: STUDENT IN AN ORGANIZED HEALTH CARE EDUCATION/TRAINING PROGRAM

## 2023-09-12 PROCEDURE — 99999 PR PBB SHADOW E&M-EST. PATIENT-LVL II: CPT | Mod: PBBFAC,,, | Performed by: STUDENT IN AN ORGANIZED HEALTH CARE EDUCATION/TRAINING PROGRAM

## 2023-09-12 NOTE — PROGRESS NOTES
"History & Physical  Gynecology      SUBJECTIVE:     Chief Complaint: Well Woman       History of Present Illness:  Annual exam. No complaints  Menstrual History: menopausal 50s. HRT for short time.   Obstetric Hx: .   Sexually Active: occasional with one male partner  Family history: breast cancer in mother in her mid 50s. Paternal cousin with Bca in early 30s  Social: Wears seatbelts. Exercises. Feels safe at home.   Last pap: 3 years ago normal.   Last mammo: 2023  Colonoscopy: cologard  DEXA: 2022 osteoporosis  Covid vaccine utd  Vitamins: taking        Review of patient's allergies indicates:   Allergen Reactions    Ciprofloxacin Other (See Comments)     Caused "neuro problem, felt like I was going to have a seizure."     Sulfa (sulfonamide antibiotics) Other (See Comments)       Past Medical History:   Diagnosis Date    Miscarriage      Past Surgical History:   Procedure Laterality Date    APPENDECTOMY      BREAST BIOPSY      BREAST SURGERY       OB History          1    Para   1    Term   1            AB        Living             SAB        IAB        Ectopic        Multiple        Live Births                   Family History   Problem Relation Age of Onset    Breast cancer Mother     Stroke Father     Breast cancer Paternal Grandmother      Social History     Tobacco Use    Smoking status: Never    Smokeless tobacco: Never       Current Outpatient Medications   Medication Sig    alendronate (FOSAMAX) 70 MG tablet Take 1 tablet (70 mg total) by mouth every 7 days.    calcium carbonate-vitamin D3 600 mg-20 mcg (800 unit) Chew     cholecalciferol, vitamin D3, 125 mcg (5,000 unit) Tab     ciclopirox (PENLAC) 8 % Soln Apply topically nightly. Clean off with alcohol every 7 days    mv-min-folic acid-lutein (CENTRUM SILVER) 400-250 mcg Chew      No current facility-administered medications for this visit.         Review of Systems:  Review of Systems   Constitutional:  Negative for " activity change, appetite change, fever and unexpected weight change.   Respiratory:  Negative for shortness of breath.    Cardiovascular:  Negative for chest pain.   Gastrointestinal:  Negative for abdominal pain, blood in stool, constipation, diarrhea, nausea and vomiting.   Endocrine: Negative for hot flashes.   Genitourinary:  Negative for dyspareunia, dysuria, frequency, hematuria, hot flashes, pelvic pain, urgency, vaginal bleeding, vaginal discharge, vaginal pain, urinary incontinence, postcoital bleeding, postmenopausal bleeding and vaginal dryness.   Integumentary:  Negative for breast mass.   Psychiatric/Behavioral:  Negative for sleep disturbance.    Breast: Negative for lump and mass       OBJECTIVE:     Physical Exam:  Physical Exam  Vitals reviewed.   Constitutional:       General: She is not in acute distress.     Appearance: She is well-developed. She is not diaphoretic.   HENT:      Head: Normocephalic and atraumatic.   Eyes:      General: No scleral icterus.        Right eye: No discharge.         Left eye: No discharge.      Conjunctiva/sclera: Conjunctivae normal.   Neck:      Thyroid: No thyromegaly.   Cardiovascular:      Rate and Rhythm: Normal rate.   Pulmonary:      Effort: Pulmonary effort is normal.   Chest:   Breasts:     Breasts are symmetrical.      Right: No inverted nipple, mass, nipple discharge, skin change or tenderness.      Left: No inverted nipple, mass, nipple discharge, skin change or tenderness.   Abdominal:      General: There is no distension.      Palpations: Abdomen is soft.      Tenderness: There is no abdominal tenderness.   Genitourinary:     Labia:         Right: No rash, tenderness, lesion or injury.         Left: No rash, tenderness, lesion or injury.       Vagina: Normal. No signs of injury and foreign body. No vaginal discharge, erythema, tenderness or bleeding.      Cervix: No cervical motion tenderness, discharge or friability.      Uterus: Not deviated, not  enlarged, not fixed and not tender.       Adnexa:         Right: No mass, tenderness or fullness.          Left: No mass, tenderness or fullness.     Musculoskeletal:         General: Normal range of motion.      Cervical back: Normal range of motion and neck supple.   Lymphadenopathy:      Cervical: No cervical adenopathy.   Skin:     General: Skin is warm and dry.      Findings: No erythema or rash.   Neurological:      Mental Status: She is alert and oriented to person, place, and time.         ASSESSMENT:       ICD-10-CM ICD-9-CM    1. Family history of ovarian cancer  Z80.41 V16.41 Ambulatory referral/consult to Obstetrics / Gynecology      2. Family history of breast cancer  Z80.3 V16.3 Ambulatory referral/consult to Obstetrics / Gynecology             Plan:      WWE  - Postmenopausal. No issues.  - Vaccines utd  - Labs utd  - DEXA, Mammogram, Colonoscopy utd  - Adequate screening with pap smear has been normal. Discussed with patient that she no longer requires screening based on ASCCP guidelines.   - Vitamin D and Calcium taking  - CBE normal. Physical exam normal. VSS     Counseling time: 15 minutes    Samra Fuentes

## 2023-09-14 ENCOUNTER — TELEPHONE (OUTPATIENT)
Dept: INTERNAL MEDICINE | Facility: CLINIC | Age: 68
End: 2023-09-14
Payer: MEDICARE

## 2023-12-07 ENCOUNTER — OFFICE VISIT (OUTPATIENT)
Dept: INTERNAL MEDICINE | Facility: CLINIC | Age: 68
End: 2023-12-07
Payer: MEDICARE

## 2023-12-07 VITALS
DIASTOLIC BLOOD PRESSURE: 62 MMHG | BODY MASS INDEX: 21 KG/M2 | SYSTOLIC BLOOD PRESSURE: 94 MMHG | OXYGEN SATURATION: 97 % | WEIGHT: 123 LBS | HEART RATE: 61 BPM | HEIGHT: 64 IN

## 2023-12-07 DIAGNOSIS — M81.0 AGE-RELATED OSTEOPOROSIS WITHOUT CURRENT PATHOLOGICAL FRACTURE: Primary | ICD-10-CM

## 2023-12-07 PROCEDURE — 99999 PR PBB SHADOW E&M-EST. PATIENT-LVL III: ICD-10-PCS | Mod: PBBFAC,,, | Performed by: STUDENT IN AN ORGANIZED HEALTH CARE EDUCATION/TRAINING PROGRAM

## 2023-12-07 PROCEDURE — 99213 OFFICE O/P EST LOW 20 MIN: CPT | Mod: S$PBB,,, | Performed by: STUDENT IN AN ORGANIZED HEALTH CARE EDUCATION/TRAINING PROGRAM

## 2023-12-07 PROCEDURE — 99213 OFFICE O/P EST LOW 20 MIN: CPT | Mod: PBBFAC | Performed by: STUDENT IN AN ORGANIZED HEALTH CARE EDUCATION/TRAINING PROGRAM

## 2023-12-07 PROCEDURE — 99999 PR PBB SHADOW E&M-EST. PATIENT-LVL III: CPT | Mod: PBBFAC,,, | Performed by: STUDENT IN AN ORGANIZED HEALTH CARE EDUCATION/TRAINING PROGRAM

## 2023-12-07 PROCEDURE — 99213 PR OFFICE/OUTPT VISIT, EST, LEVL III, 20-29 MIN: ICD-10-PCS | Mod: S$PBB,,, | Performed by: STUDENT IN AN ORGANIZED HEALTH CARE EDUCATION/TRAINING PROGRAM

## 2023-12-07 RX ORDER — IBANDRONATE SODIUM 150 MG/1
150 TABLET, FILM COATED ORAL
Qty: 1 TABLET | Refills: 11 | Status: SHIPPED | OUTPATIENT
Start: 2023-12-07 | End: 2024-12-06

## 2023-12-07 NOTE — PROGRESS NOTES
"To for him  [facility]    Subjective:       Patient ID: Fior Moreland is a 68 y.o. female.    Chief Complaint: Stress (F/u)      History was obtained from the patient and supplemented through chart review.  This patient is known to me.     HPI:    Patient is a 68 y.o. female presents to for annual.    No chest stress, mother    High risk of breast cancer  Monitored through [facility] in the past, now with [facility]  PGM  ovarian cancer  1st cousin with breast cancer  Was following with MRIs and mammo  Seeing gyn Dr. Fuentes    Vertigo- not an issue    HLD  Controlled with diet alone    Followed with [facility]- [facility], Dr. 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   HENT:  Negative for trouble swallowing.    Respiratory:  Negative for shortness of breath.    Cardiovascular:  Negative for chest pain.   Gastrointestinal:  Negative for constipation, diarrhea, nausea and vomiting.   Musculoskeletal:  Negative for gait problem.   Neurological:  Negative for seizures.   Psychiatric/Behavioral:  Negative for hallucinations.          Surgical hx, family hx, social hx   Have been reviewed      Current Outpatient Medications:     cholecalciferol, vitamin D3, 125 mcg (5,000 unit) Tab, , Disp: , Rfl:     ciclopirox (PENLAC) 8 % Soln, Apply topically nightly. Clean off with alcohol every 7 days, Disp: 6.6 mL, Rfl: 1    mv-min-folic acid-lutein (CENTRUM SILVER) 400-250 mcg Chew, , Disp: , Rfl:     calcium " "carbonate-vitamin D3 600 mg-20 mcg (800 unit) Chew, , Disp: , Rfl:     ibandronate (BONIVA) 150 mg tablet, Take 1 tablet (150 mg total) by mouth every 30 days., Disp: 1 tablet, Rfl: 11    Objective:        Body mass index is 21.12 kg/m².  Vitals:    12/07/23 1358   BP: 94/62   Pulse: 61   SpO2: 97%   Weight: 55.8 kg (123 lb 0.3 oz)   Height: 5' 4" (1.626 m)   PainSc: 0-No pain     Physical Exam  Vitals and nursing note reviewed.   Constitutional:       General: She is not in acute distress.     Appearance: Normal appearance. She is not ill-appearing.   HENT:      Head: Normocephalic and atraumatic.   Eyes:      General: No scleral icterus.  Cardiovascular:      Rate and Rhythm: Normal rate and regular rhythm.      Heart sounds: Normal heart sounds.   Pulmonary:      Effort: Pulmonary effort is normal.   Musculoskeletal:         General: No deformity.      Cervical back: Normal range of motion.   Skin:     General: Skin is warm and dry.   Neurological:      Mental Status: She is alert and oriented to person, place, and time.   Psychiatric:         Behavior: Behavior normal.           Lab Results   Component Value Date    WBC 6.61 07/14/2023    HGB 13.8 07/14/2023    HCT 42.6 07/14/2023     07/14/2023    CHOL 208 (H) 07/14/2023    TRIG 62 07/14/2023    HDL 77 (H) 07/14/2023    ALT 17 07/14/2023    AST 19 07/14/2023     07/14/2023    K 4.5 07/14/2023     07/14/2023    CREATININE 0.8 07/14/2023    BUN 17 07/14/2023    CO2 31 (H) 07/14/2023    TSH 1.677 07/14/2023    HGBA1C 5.5 07/14/2023       The 10-year ASCVD risk score (Bridget DE SOUZA, et al., 2019) is: 3.5%    Values used to calculate the score:      Age: 68 years      Sex: Female      Is Non- : No      Diabetic: No      Tobacco smoker: No      Systolic Blood Pressure: 94 mmHg      Is BP treated: No      HDL Cholesterol: 77 mg/dL      Total Cholesterol: 208 mg/dL    (Imaging have been independently reviewed)    Mammo " 7/19/2023    Assessment:         1. Age-related osteoporosis without current pathological fracture              Plan:     Fior was seen today for stress.    Diagnoses and all orders for this visit:    Age-related osteoporosis without current pathological fracture  -     ibandronate (BONIVA) 150 mg tablet; Take 1 tablet (150 mg total) by mouth every 30 days.            Health Maintenance  - Lipids:    - A1C:   - Colon Ca Screen: Cologuard neg 6/20/2021  - Immunizations: will get PNA, tetanus, RSV    Women's health  - Pap: 6/19/2018? (pt states 2019), Dr. Romero, now Dr. Fuentes  - Mammo: mammo 7/19/2023  - Dexa: osteoporosis 4/13/2022, taking fosamax -> boniva  - Contraception: post-menopausal    Follow up in about 6 months (around 6/7/2024) for annual or sooner if needed.     or sooner prn      All medications were reviewed including potential side effects and risks/benefits.  Pt was counseled to call back if anything worsens or if questions arise.    August Batista MD  Family Medicine  [facility]  [employer]  [address]  [place] [address]  Phone [phone]  Fax [phone]

## 2024-01-11 DIAGNOSIS — Z00.00 ENCOUNTER FOR MEDICARE ANNUAL WELLNESS EXAM: ICD-10-CM

## 2024-01-29 ENCOUNTER — PATIENT MESSAGE (OUTPATIENT)
Dept: ADMINISTRATIVE | Facility: HOSPITAL | Age: 69
End: 2024-01-29
Payer: MEDICARE

## 2024-01-29 ENCOUNTER — PATIENT OUTREACH (OUTPATIENT)
Dept: ADMINISTRATIVE | Facility: HOSPITAL | Age: 69
End: 2024-01-29
Payer: MEDICARE

## 2024-01-29 NOTE — PROGRESS NOTES
Population Health Chart Review & Patient Outreach Details    Outreach Performed: YES Telephone Not Successful    Additional Pop Health Notes:           Updates Requested / Reviewed:      Updated Care Coordination Note, Care Everywhere, Care Team Updated, Removed  or Duplicate Orders, and Immunizations Reconciliation Completed or Queried: [place]         Health Maintenance Topics Overdue:    Health Maintenance Due   Topic Date Due    Pneumococcal Vaccines (Age 65+) (1 of 2 - PCV) Never done    RSV Vaccine (Age 60+ and Pregnant patients) (1 - 1-dose 60+ series) Never done         Health Maintenance Topic(s) Outreach Outcomes & Actions Taken:    Colorectal Cancer Screening - Outreach Outcomes & Actions Taken  : left voicemail. Portal msg sent

## 2024-05-24 ENCOUNTER — OFFICE VISIT (OUTPATIENT)
Dept: URGENT CARE | Facility: CLINIC | Age: 69
End: 2024-05-24
Payer: MEDICARE

## 2024-05-24 VITALS
BODY MASS INDEX: 21 KG/M2 | HEIGHT: 64 IN | OXYGEN SATURATION: 98 % | DIASTOLIC BLOOD PRESSURE: 68 MMHG | TEMPERATURE: 98 F | RESPIRATION RATE: 18 BRPM | HEART RATE: 70 BPM | WEIGHT: 123 LBS | SYSTOLIC BLOOD PRESSURE: 106 MMHG

## 2024-05-24 DIAGNOSIS — R30.0 DYSURIA: ICD-10-CM

## 2024-05-24 DIAGNOSIS — N30.01 ACUTE CYSTITIS WITH HEMATURIA: Primary | ICD-10-CM

## 2024-05-24 DIAGNOSIS — N95.2 ATROPHIC VAGINITIS: ICD-10-CM

## 2024-05-24 LAB
BILIRUB UR QL STRIP: NEGATIVE
GLUCOSE UR QL STRIP: NEGATIVE
KETONES UR QL STRIP: NEGATIVE
LEUKOCYTE ESTERASE UR QL STRIP: NEGATIVE
PH, POC UA: 7 (ref 5–8)
POC BLOOD, URINE: POSITIVE
POC NITRATES, URINE: NEGATIVE
PROT UR QL STRIP: POSITIVE
SP GR UR STRIP: 1.02 (ref 1–1.03)
UROBILINOGEN UR STRIP-ACNC: NORMAL (ref 0.1–1.1)

## 2024-05-24 PROCEDURE — 87088 URINE BACTERIA CULTURE: CPT | Performed by: FAMILY MEDICINE

## 2024-05-24 PROCEDURE — 87077 CULTURE AEROBIC IDENTIFY: CPT | Performed by: FAMILY MEDICINE

## 2024-05-24 PROCEDURE — 99214 OFFICE O/P EST MOD 30 MIN: CPT | Mod: S$GLB,,, | Performed by: FAMILY MEDICINE

## 2024-05-24 PROCEDURE — 87086 URINE CULTURE/COLONY COUNT: CPT | Performed by: FAMILY MEDICINE

## 2024-05-24 PROCEDURE — 81003 URINALYSIS AUTO W/O SCOPE: CPT | Mod: QW,S$GLB,, | Performed by: FAMILY MEDICINE

## 2024-05-24 PROCEDURE — 87186 SC STD MICRODIL/AGAR DIL: CPT | Performed by: FAMILY MEDICINE

## 2024-05-24 RX ORDER — NITROFURANTOIN 25; 75 MG/1; MG/1
100 CAPSULE ORAL 2 TIMES DAILY
Qty: 10 CAPSULE | Refills: 0 | Status: SHIPPED | OUTPATIENT
Start: 2024-05-24 | End: 2024-05-29

## 2024-05-24 NOTE — PROGRESS NOTES
"Subjective:      Patient ID: Fior Moreland is a 68 y.o. female.    Vitals:  height is 5' 4" (1.626 m) and weight is 55.8 kg (123 lb). Her temperature is 98.2 °F (36.8 °C). Her blood pressure is 106/68 and her pulse is 70. Her respiration is 18 and oxygen saturation is 98%.     Chief Complaint: Dysuria    Pt presents with complaint of dysuria, urgency, frequency x3 days.  Pt states she has taken otc azo, cranberry pills and tea for her symptoms. The azo helped significantly but she had an accident and stained some fabric so has stopped that. Pt states she has been going to the bathroom more frequently and states she has burning initially.  She has not been using her vaginal estrogen. Denies vaginal itching or discharge. She admits to mild LLQ discomfort but states this is a chronic issue for her    Dysuria   This is a new problem. The current episode started in the past 7 days. The problem has been unchanged. The quality of the pain is described as burning. The pain is at a severity of 4/10. The pain is moderate. She is Not sexually active. There is No history of pyelonephritis. Associated symptoms include frequency and urgency. Pertinent negatives include no discharge, flank pain, nausea or vomiting. Treatments tried: azo, cranberry pills. The treatment provided no relief.       Gastrointestinal:  Negative for nausea and vomiting.   Genitourinary:  Positive for dysuria, frequency and urgency. Negative for flank pain.      Objective:     Physical Exam   Abdominal: Normal appearance.   Neurological: She is alert.   Nursing note and vitals reviewed.  Physical Exam   Constitutional: She is oriented to person, place, and time. She appears well-developed and well-nourished.   Abdominal: Soft. Bowel sounds are normal.   There is tenderness (mild suprapubic tenderness , no flank pain on percussion).   Neurological: She is alert and oriented to person, place, and time.   Skin: Skin is warm.   Psychiatric: She has a " normal mood and affect. Her behavior is normal. Judgment and thought content normal.   Nursing note and vitals reviewed.     Assessment:     1. Acute cystitis with hematuria    2. Dysuria    3. Atrophic vaginitis        Plan:       Acute cystitis with hematuria  -     nitrofurantoin, macrocrystal-monohydrate, (MACROBID) 100 MG capsule; Take 1 capsule (100 mg total) by mouth 2 (two) times daily. for 5 days  Dispense: 10 capsule; Refill: 0    Dysuria  -     POCT Urinalysis, Dipstick, Automated, W/O Scope  -     Urine culture  -     nitrofurantoin, macrocrystal-monohydrate, (MACROBID) 100 MG capsule; Take 1 capsule (100 mg total) by mouth 2 (two) times daily. for 5 days  Dispense: 10 capsule; Refill: 0    Atrophic vaginitis  Consider resuming estrogen cream  Follow with PCP about blood in urine especially if culture negative  Pt or guardian provided educational materials and instructions regarding their visit diagnosis.

## 2024-05-27 ENCOUNTER — TELEPHONE (OUTPATIENT)
Dept: URGENT CARE | Facility: CLINIC | Age: 69
End: 2024-05-27
Payer: MEDICARE

## 2024-05-27 LAB — BACTERIA UR CULT: ABNORMAL

## 2024-05-27 NOTE — TELEPHONE ENCOUNTER
Urine cx shows ENTEROCOCCUS FAECALIS  Sensitive to prescribed nitrofurantoin.  Pt states urinary symptoms are much better with abx.  Pt has an appt Friday with PCP and she will have them recheck her urine for resolution of UTI.

## 2024-05-27 NOTE — TELEPHONE ENCOUNTER
Urine cx shows ENTEROCOCCUS FAECALIS   Sensitive to prescribed nitrofurantoin.  NP called pt and left VM that pt can view results online or call clinic for questions or concerns.  Call back info provided.

## 2024-06-07 ENCOUNTER — OFFICE VISIT (OUTPATIENT)
Dept: INTERNAL MEDICINE | Facility: CLINIC | Age: 69
End: 2024-06-07
Payer: MEDICARE

## 2024-06-07 ENCOUNTER — LAB VISIT (OUTPATIENT)
Dept: LAB | Facility: OTHER | Age: 69
End: 2024-06-07
Attending: STUDENT IN AN ORGANIZED HEALTH CARE EDUCATION/TRAINING PROGRAM
Payer: MEDICARE

## 2024-06-07 VITALS — OXYGEN SATURATION: 96 % | HEART RATE: 64 BPM | HEIGHT: 64 IN | WEIGHT: 119.5 LBS | BODY MASS INDEX: 20.4 KG/M2

## 2024-06-07 DIAGNOSIS — M81.0 AGE-RELATED OSTEOPOROSIS WITHOUT CURRENT PATHOLOGICAL FRACTURE: ICD-10-CM

## 2024-06-07 DIAGNOSIS — L98.9 SKIN LESION: ICD-10-CM

## 2024-06-07 DIAGNOSIS — Z12.11 SCREENING FOR MALIGNANT NEOPLASM OF COLON: ICD-10-CM

## 2024-06-07 DIAGNOSIS — N30.00 ACUTE CYSTITIS WITHOUT HEMATURIA: ICD-10-CM

## 2024-06-07 DIAGNOSIS — E78.1 HYPERTRIGLYCERIDEMIA: ICD-10-CM

## 2024-06-07 DIAGNOSIS — Z91.89 AT RISK FOR BLEEDING: ICD-10-CM

## 2024-06-07 DIAGNOSIS — R73.03 PRE-DIABETES: Primary | ICD-10-CM

## 2024-06-07 DIAGNOSIS — B35.1 ONYCHOMYCOSIS: ICD-10-CM

## 2024-06-07 DIAGNOSIS — B95.2 UTI (URINARY TRACT INFECTION) DUE TO ENTEROCOCCUS: ICD-10-CM

## 2024-06-07 DIAGNOSIS — R73.03 PRE-DIABETES: ICD-10-CM

## 2024-06-07 DIAGNOSIS — Z80.3 FAMILY HISTORY OF BREAST CANCER: ICD-10-CM

## 2024-06-07 DIAGNOSIS — D18.03 LIVER HEMANGIOMA: Chronic | ICD-10-CM

## 2024-06-07 DIAGNOSIS — N39.0 UTI (URINARY TRACT INFECTION) DUE TO ENTEROCOCCUS: ICD-10-CM

## 2024-06-07 DIAGNOSIS — R42 VERTIGO: ICD-10-CM

## 2024-06-07 DIAGNOSIS — Z80.41 FAMILY HISTORY OF OVARIAN CANCER: ICD-10-CM

## 2024-06-07 LAB
ALBUMIN SERPL BCP-MCNC: 4.2 G/DL (ref 3.5–5.2)
ALP SERPL-CCNC: 79 U/L (ref 55–135)
ALT SERPL W/O P-5'-P-CCNC: 18 U/L (ref 10–44)
ANION GAP SERPL CALC-SCNC: 8 MMOL/L (ref 8–16)
AST SERPL-CCNC: 18 U/L (ref 10–40)
BASOPHILS # BLD AUTO: 0.07 K/UL (ref 0–0.2)
BASOPHILS NFR BLD: 0.7 % (ref 0–1.9)
BILIRUB SERPL-MCNC: 0.5 MG/DL (ref 0.1–1)
BUN SERPL-MCNC: 20 MG/DL (ref 8–23)
CALCIUM SERPL-MCNC: 9.9 MG/DL (ref 8.7–10.5)
CHLORIDE SERPL-SCNC: 103 MMOL/L (ref 95–110)
CHOLEST SERPL-MCNC: 188 MG/DL (ref 120–199)
CHOLEST/HDLC SERPL: 3.2 {RATIO} (ref 2–5)
CO2 SERPL-SCNC: 27 MMOL/L (ref 23–29)
CREAT SERPL-MCNC: 1.1 MG/DL (ref 0.5–1.4)
DIFFERENTIAL METHOD BLD: NORMAL
EOSINOPHIL # BLD AUTO: 0.1 K/UL (ref 0–0.5)
EOSINOPHIL NFR BLD: 0.7 % (ref 0–8)
ERYTHROCYTE [DISTWIDTH] IN BLOOD BY AUTOMATED COUNT: 12 % (ref 11.5–14.5)
EST. GFR  (NO RACE VARIABLE): 55 ML/MIN/1.73 M^2
ESTIMATED AVG GLUCOSE: 114 MG/DL (ref 68–131)
GLUCOSE SERPL-MCNC: 87 MG/DL (ref 70–110)
HBA1C MFR BLD: 5.6 % (ref 4–5.6)
HCT VFR BLD AUTO: 43.4 % (ref 37–48.5)
HDLC SERPL-MCNC: 58 MG/DL (ref 40–75)
HDLC SERPL: 30.9 % (ref 20–50)
HGB BLD-MCNC: 14 G/DL (ref 12–16)
IMM GRANULOCYTES # BLD AUTO: 0.03 K/UL (ref 0–0.04)
IMM GRANULOCYTES NFR BLD AUTO: 0.3 % (ref 0–0.5)
LDLC SERPL CALC-MCNC: 87 MG/DL (ref 63–159)
LYMPHOCYTES # BLD AUTO: 2.1 K/UL (ref 1–4.8)
LYMPHOCYTES NFR BLD: 21.8 % (ref 18–48)
MCH RBC QN AUTO: 30.9 PG (ref 27–31)
MCHC RBC AUTO-ENTMCNC: 32.3 G/DL (ref 32–36)
MCV RBC AUTO: 96 FL (ref 82–98)
MONOCYTES # BLD AUTO: 0.7 K/UL (ref 0.3–1)
MONOCYTES NFR BLD: 7.5 % (ref 4–15)
NEUTROPHILS # BLD AUTO: 6.7 K/UL (ref 1.8–7.7)
NEUTROPHILS NFR BLD: 69 % (ref 38–73)
NONHDLC SERPL-MCNC: 130 MG/DL
NRBC BLD-RTO: 0 /100 WBC
PLATELET # BLD AUTO: 371 K/UL (ref 150–450)
PMV BLD AUTO: 10 FL (ref 9.2–12.9)
POTASSIUM SERPL-SCNC: 4.5 MMOL/L (ref 3.5–5.1)
PROT SERPL-MCNC: 7.7 G/DL (ref 6–8.4)
RBC # BLD AUTO: 4.53 M/UL (ref 4–5.4)
SODIUM SERPL-SCNC: 138 MMOL/L (ref 136–145)
TRIGL SERPL-MCNC: 215 MG/DL (ref 30–150)
TSH SERPL DL<=0.005 MIU/L-ACNC: 0.67 UIU/ML (ref 0.4–4)
WBC # BLD AUTO: 9.72 K/UL (ref 3.9–12.7)

## 2024-06-07 PROCEDURE — 85025 COMPLETE CBC W/AUTO DIFF WBC: CPT | Performed by: STUDENT IN AN ORGANIZED HEALTH CARE EDUCATION/TRAINING PROGRAM

## 2024-06-07 PROCEDURE — 99214 OFFICE O/P EST MOD 30 MIN: CPT | Mod: S$PBB,,, | Performed by: STUDENT IN AN ORGANIZED HEALTH CARE EDUCATION/TRAINING PROGRAM

## 2024-06-07 PROCEDURE — 80053 COMPREHEN METABOLIC PANEL: CPT | Performed by: STUDENT IN AN ORGANIZED HEALTH CARE EDUCATION/TRAINING PROGRAM

## 2024-06-07 PROCEDURE — 83036 HEMOGLOBIN GLYCOSYLATED A1C: CPT | Performed by: STUDENT IN AN ORGANIZED HEALTH CARE EDUCATION/TRAINING PROGRAM

## 2024-06-07 PROCEDURE — 99999 PR PBB SHADOW E&M-EST. PATIENT-LVL IV: CPT | Mod: PBBFAC,,, | Performed by: STUDENT IN AN ORGANIZED HEALTH CARE EDUCATION/TRAINING PROGRAM

## 2024-06-07 PROCEDURE — 99214 OFFICE O/P EST MOD 30 MIN: CPT | Mod: PBBFAC | Performed by: STUDENT IN AN ORGANIZED HEALTH CARE EDUCATION/TRAINING PROGRAM

## 2024-06-07 PROCEDURE — 80061 LIPID PANEL: CPT | Performed by: STUDENT IN AN ORGANIZED HEALTH CARE EDUCATION/TRAINING PROGRAM

## 2024-06-07 PROCEDURE — G2211 COMPLEX E/M VISIT ADD ON: HCPCS | Mod: S$PBB,,, | Performed by: STUDENT IN AN ORGANIZED HEALTH CARE EDUCATION/TRAINING PROGRAM

## 2024-06-07 PROCEDURE — 84443 ASSAY THYROID STIM HORMONE: CPT | Performed by: STUDENT IN AN ORGANIZED HEALTH CARE EDUCATION/TRAINING PROGRAM

## 2024-06-07 PROCEDURE — 36415 COLL VENOUS BLD VENIPUNCTURE: CPT | Performed by: STUDENT IN AN ORGANIZED HEALTH CARE EDUCATION/TRAINING PROGRAM

## 2024-06-07 RX ORDER — IBANDRONATE SODIUM 150 MG/1
150 TABLET, FILM COATED ORAL
Qty: 1 TABLET | Refills: 11 | Status: SHIPPED | OUTPATIENT
Start: 2024-06-07 | End: 2025-06-07

## 2024-06-07 NOTE — PROGRESS NOTES
"To for him  [facility]    Subjective:       Patient ID: Fior Moreland is a 68 y.o. female.    Chief Complaint: Annual Exam (Prediabetes, uti f/u)      History was obtained from the patient and supplemented through chart review.  This patient is known to me.     HPI:    Patient is a 68 y.o. female presents to for annual, review of prediabetes, hypatic cyst, osteoporosis f/u of uti from urgent care    Pt and  sick for past week, pt improved,  hospitalized.  Bacteria infection, appt for Dr. Toribio upcoming; colonized with cardiac hardware  PICC line coming in today    [facility]    Recent enterococcus UTI at urgent care  Recheck today    High risk of breast cancer, TC score improved  [facility]  ovarian cancer  1st cousin with breast cancer  Was following with MRIs and mammo  Seeing gyn Dr. Fuentes    Vertigo- not an issue    HLD  Controlled with diet alone    Followed with [facility]- [facility], Dr. 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   HENT:  Negative for trouble swallowing.    Respiratory:  Negative for shortness of breath.    Cardiovascular:  Negative for chest pain.   Gastrointestinal:  Negative for constipation, diarrhea, nausea and vomiting.   Musculoskeletal:  Negative for gait problem.   Neurological:  Negative for seizures.   Psychiatric/Behavioral:  Negative for " "hallucinations.          Surgical hx, family hx, social hx   Have been reviewed      Current Outpatient Medications:     calcium carbonate-vitamin D3 600 mg-20 mcg (800 unit) Chew, , Disp: , Rfl:     cholecalciferol, vitamin D3, 125 mcg (5,000 unit) Tab, , Disp: , Rfl:     ibandronate (BONIVA) 150 mg tablet, Take 1 tablet (150 mg total) by mouth every 30 days., Disp: 1 tablet, Rfl: 11    mv-min-folic acid-lutein (CENTRUM SILVER) 400-250 mcg Chew, , Disp: , Rfl:     ciclopirox (PENLAC) 8 % Soln, Apply topically nightly. Clean off with alcohol every 7 days (Patient not taking: Reported on 6/7/2024), Disp: 6.6 mL, Rfl: 1    Objective:        Body mass index is 20.51 kg/m².  Vitals:    06/07/24 1254   BP: (P) 96/62   Pulse: 64   SpO2: 96%   Weight: 54.2 kg (119 lb 7.8 oz)   Height: 5' 4" (1.626 m)   PainSc: 0-No pain       Physical Exam  Vitals and nursing note reviewed.   Constitutional:       General: She is not in acute distress.     Appearance: Normal appearance. She is not ill-appearing.   HENT:      Head: Normocephalic and atraumatic.   Eyes:      General: No scleral icterus.  Cardiovascular:      Rate and Rhythm: Normal rate and regular rhythm.      Heart sounds: Normal heart sounds.   Pulmonary:      Effort: Pulmonary effort is normal.   Musculoskeletal:         General: No deformity.      Cervical back: Normal range of motion.   Skin:     General: Skin is warm and dry.   Neurological:      Mental Status: She is alert and oriented to person, place, and time.   Psychiatric:         Behavior: Behavior normal.           Lab Results   Component Value Date    WBC 6.61 07/14/2023    HGB 13.8 07/14/2023    HCT 42.6 07/14/2023     07/14/2023    CHOL 208 (H) 07/14/2023    TRIG 62 07/14/2023    HDL 77 (H) 07/14/2023    ALT 17 07/14/2023    AST 19 07/14/2023     07/14/2023    K 4.5 07/14/2023     07/14/2023    CREATININE 0.8 07/14/2023    BUN 17 07/14/2023    CO2 31 (H) 07/14/2023    TSH 1.677 07/14/2023 "    HGBA1C 5.5 07/14/2023       The 10-year ASCVD risk score (Bridget DE SOUZA, et al., 2019) is: 3.6%    Values used to calculate the score:      Age: 68 years      Sex: Female      Is Non- : No      Diabetic: No      Tobacco smoker: No      Systolic Blood Pressure: 96 mmHg      Is BP treated: No      HDL Cholesterol: 77 mg/dL      Total Cholesterol: 208 mg/dL    (Imaging have been independently reviewed)    Mammo 7/19/2023    Assessment:         1. Pre-diabetes    2. Hypertriglyceridemia    3. Liver hemangioma    4. Family history of ovarian cancer    5. Family history of breast cancer    6. Age-related osteoporosis without current pathological fracture    7. At risk for bleeding    8. Vertigo    9. Acute cystitis without hematuria    10. UTI (urinary tract infection) due to Enterococcus    11. Screening for malignant neoplasm of colon    12. Skin lesion    13. Onychomycosis                Plan:     Fior was seen today for annual exam.    Diagnoses and all orders for this visit:    Pre-diabetes  -     TSH; Future  -     Hemoglobin A1C; Future    Hypertriglyceridemia  -     Lipid Panel; Future    Liver hemangioma  -     Comprehensive Metabolic Panel; Future    Family history of ovarian cancer    Family history of breast cancer    Age-related osteoporosis without current pathological fracture  -     ibandronate (BONIVA) 150 mg tablet; Take 1 tablet (150 mg total) by mouth every 30 days.  -     DXA Bone Density Axial Skeleton 1 or more sites; Future    At risk for bleeding  -     CBC Auto Differential; Future    Vertigo  -     Comprehensive Metabolic Panel; Future    Acute cystitis without hematuria  -     Urine culture; Future  -     Urinalysis; Future    UTI (urinary tract infection) due to Enterococcus  -     Urine culture; Future  -     Urinalysis; Future    Screening for malignant neoplasm of colon  -     Cologuard Screening (Multitarget Stool DNA); Future  -     Cologuard Screening (Multitarget  Stool DNA)    Skin lesion  -     Ambulatory referral/consult to Dermatology; Future    Onychomycosis  -     Ambulatory referral/consult to Podiatry; Future              Health Maintenance  - Lipids:    - A1C:   - Colon Ca Screen: Cologuard neg 6/20/2021  - Immunizations: will get PNA, tetanus, RSV    Women's health  - Pap: 6/19/2018? (pt states 2019), Dr. Romero, now Dr. Fuentes  - Mammo: mammo 7/19/2023  - Dexa: osteoporosis 4/13/2022, taking boniva  - Contraception: post-menopausal    Follow up in about 6 months (around 12/7/2024).     or sooner prn    32 min were used in chart review, evaluation and counseling of patient, documentation and review of results on same day of service    Visit today is associated with current or anticipated ongoing medical care related to this patient's single serious condition/complex condition of prediabetes, recent illness with uti, osteoporosis. The patient will return to see me as these issues will be followed longitudinally.      All medications were reviewed including potential side effects and risks/benefits.  Pt was counseled to call back if anything worsens or if questions arise.    August Batista MD  Family Medicine  [facility]  [address]  [address]  [place] [address]  Phone [phone]  Fax [phone]

## 2024-06-12 ENCOUNTER — HOSPITAL ENCOUNTER (OUTPATIENT)
Dept: RADIOLOGY | Facility: OTHER | Age: 69
Discharge: HOME | End: 2024-06-12
Attending: STUDENT IN AN ORGANIZED HEALTH CARE EDUCATION/TRAINING PROGRAM
Payer: MEDICARE

## 2024-06-12 DIAGNOSIS — M81.0 AGE-RELATED OSTEOPOROSIS WITHOUT CURRENT PATHOLOGICAL FRACTURE: ICD-10-CM

## 2024-06-12 PROCEDURE — 77080 DXA BONE DENSITY AXIAL: CPT | Mod: TC

## 2024-06-12 PROCEDURE — 77080 DXA BONE DENSITY AXIAL: CPT | Mod: 26,,, | Performed by: RADIOLOGY

## 2024-06-13 ENCOUNTER — OFFICE VISIT (OUTPATIENT)
Dept: PODIATRY | Facility: CLINIC | Age: 69
End: 2024-06-13
Payer: MEDICARE

## 2024-06-13 VITALS
WEIGHT: 119.5 LBS | HEART RATE: 59 BPM | HEIGHT: 64 IN | BODY MASS INDEX: 20.4 KG/M2 | DIASTOLIC BLOOD PRESSURE: 45 MMHG | SYSTOLIC BLOOD PRESSURE: 106 MMHG

## 2024-06-13 DIAGNOSIS — M79.672 CHRONIC FOOT PAIN, LEFT: ICD-10-CM

## 2024-06-13 DIAGNOSIS — B35.1 ONYCHOMYCOSIS DUE TO DERMATOPHYTE: Primary | ICD-10-CM

## 2024-06-13 DIAGNOSIS — M25.572 CHRONIC PAIN OF LEFT ANKLE: ICD-10-CM

## 2024-06-13 DIAGNOSIS — M79.605 LEFT LEG PAIN: ICD-10-CM

## 2024-06-13 DIAGNOSIS — B35.1 ONYCHOMYCOSIS: ICD-10-CM

## 2024-06-13 DIAGNOSIS — M79.89 LEFT LEG SWELLING: ICD-10-CM

## 2024-06-13 DIAGNOSIS — G89.29 CHRONIC FOOT PAIN, LEFT: ICD-10-CM

## 2024-06-13 DIAGNOSIS — G89.29 CHRONIC PAIN OF LEFT ANKLE: ICD-10-CM

## 2024-06-13 PROCEDURE — 99204 OFFICE O/P NEW MOD 45 MIN: CPT | Mod: S$PBB,,, | Performed by: PODIATRIST

## 2024-06-13 PROCEDURE — 99999 PR PBB SHADOW E&M-EST. PATIENT-LVL III: CPT | Mod: PBBFAC,,, | Performed by: PODIATRIST

## 2024-06-13 PROCEDURE — 99213 OFFICE O/P EST LOW 20 MIN: CPT | Mod: PBBFAC,PN | Performed by: PODIATRIST

## 2024-06-13 RX ORDER — CICLOPIROX 80 MG/ML
SOLUTION TOPICAL NIGHTLY
Qty: 6.6 ML | Refills: 11 | Status: SHIPPED | OUTPATIENT
Start: 2024-06-13

## 2024-06-13 NOTE — PROGRESS NOTES
Please inquire is pt wishes to switch to fosamax once a week (which she didn't want in the past) or go to endocrinology for evaluation.  Thanks

## 2024-06-13 NOTE — PROGRESS NOTES
Subjective:      Patient ID: Fior Moreland is a 68 y.o. female.    Chief Complaint: Nail Problem (Damaged toenails)    Thick discolored toenails.  Unknown onset, present for greater than 20 years chronic chronically periods has negative testing for fungus.  Has responded well to a short course so far of topical Penlac.  Would like to continue.      Cc2 longstanding 10 years or greater intermittent minor pain of the left foot and ankle.  Was evaluated about 10 years ago and x-rays with surgery recommended which she declined.  Been most part, pain has resolved entirely but occasionally when she carry something heavy she gets minor discomfort that has generalized throughout the ankle and foot left with distention of the veins at the left ankle.  Denies repeat trauma left foot and ankle, no self-treatment, no medical treatment.    ROS        Objective:      Physical Exam  Constitutional:       General: She is not in acute distress.     Appearance: She is well-developed. She is not diaphoretic.   Cardiovascular:      Pulses:           Popliteal pulses are 2+ on the right side and 2+ on the left side.        Dorsalis pedis pulses are 2+ on the right side and 2+ on the left side.        Posterior tibial pulses are 2+ on the right side and 2+ on the left side.      Comments: Capillary refill 3 seconds all toes/distal feet, all toes/both feet warm to touch.      Negative lymphadenopathy bilateral popliteal fossa and tarsal tunnel.     Less than 2+ pitting lower extremity edema left.  No discernible edema right lower extremity  Musculoskeletal:      Right ankle: No swelling, deformity, ecchymosis or lacerations. Normal range of motion. Normal pulse.      Right Achilles Tendon: Normal. No defects. Gongora's test negative.      Comments: No diffuse or focal pain to palpation range of motion foot and ankle of the left today or right.    Ankle bilateral has negative anterior drawer sign, negative posterior drawer sign,  negative external rotation test, negative squeeze test.    Normal angle, base, station of gait. All ten toes without clubbing, cyanosis, or signs of ischemia.  No pain to palpation bilateral lower extremities.  Range of motion, stability, muscle strength, and muscle tone normal bilateral feet and legs.    Lymphadenopathy:      Lower Body: No right inguinal adenopathy. No left inguinal adenopathy.      Comments: Negative lymphadenopathy bilateral popliteal fossa and tarsal tunnel.    Negative lymphangitic streaking bilateral feet/ankles/legs.   Skin:     General: Skin is warm and dry.      Capillary Refill: Capillary refill takes 2 to 3 seconds.      Coloration: Skin is not pale.      Findings: No abrasion, bruising, burn, ecchymosis, erythema, laceration, lesion or rash.      Nails: There is no clubbing.      Comments:   Skin is normal age and health appropriate color, turgor, texture, and temperature bilateral lower extremities without ulceration, hyperpigmentation, discoloration, masses nodules or cords palpated.  No ecchymosis, erythema, edema, or cardinal signs of infection bilateral lower extremities.    All 10 toenails are mildly yellowed and minimally thickened without several notable debris without periungual skin abnormality without open skin drainage pus tracking fluctuance malodor signs of infection.   Neurological:      Mental Status: She is alert and oriented to person, place, and time.      Sensory: No sensory deficit.      Motor: No tremor, atrophy or abnormal muscle tone.      Gait: Gait normal.      Comments: Negative tinel sign to percussion sural, superficial peroneal, deep peroneal, saphenous, and posterior tibial nerves right and left ankles and feet.    Negative allodynia both feet   Psychiatric:         Behavior: Behavior is cooperative.           Assessment:       Encounter Diagnoses   Name Primary?    Onychomycosis     Onychomycosis due to dermatophyte Yes    Chronic pain of left ankle      Chronic foot pain, left     Left leg swelling     Left leg pain          Plan:       Fior was seen today for nail problem.    Diagnoses and all orders for this visit:    Onychomycosis due to dermatophyte    Onychomycosis  -     Ambulatory referral/consult to Podiatry    Chronic pain of left ankle    Chronic foot pain, left    Left leg swelling    Left leg pain    Other orders  -     ciclopirox (PENLAC) 8 % Soln; Apply topically nightly.      I counseled the patient on her conditions, their implications and medical management.        Ultrasound left lower extremity evaluate for thrombosis.      Declines fracture boot today.     Patient will stretch the tendo achilles complex three times daily as demonstrated in the office.  Literature was dispensed illustrating proper stretching technique.    Patient will obtain over the counter arch supports and wear them in shoes whenever possible.  Athletic shoes intended for walking or running are usually best.    Discussed conservative treatment with shoes of adequate dimensions, material, and style to alleviate symptoms and delay or prevent surgical intervention.    Xrays left foot and ankle.    Continue, refill penlac.    2 weeks, prn.          Follow up in about 2 weeks (around 6/27/2024).

## 2024-06-14 ENCOUNTER — TELEPHONE (OUTPATIENT)
Dept: INTERNAL MEDICINE | Facility: CLINIC | Age: 69
End: 2024-06-14
Payer: MEDICARE

## 2024-06-14 DIAGNOSIS — M81.0 AGE-RELATED OSTEOPOROSIS WITHOUT CURRENT PATHOLOGICAL FRACTURE: Primary | ICD-10-CM

## 2024-06-14 NOTE — TELEPHONE ENCOUNTER
----- Message from August Batista MD sent at 6/13/2024  5:21 PM CDT -----  Please inquire is pt wishes to switch to fosamax once a week (which she didn't want in the past) or go to endocrinology for evaluation.  Thanks

## 2024-06-17 ENCOUNTER — HOSPITAL ENCOUNTER (OUTPATIENT)
Dept: RADIOLOGY | Facility: OTHER | Age: 69
Discharge: HOME | End: 2024-06-17
Attending: PODIATRIST
Payer: MEDICARE

## 2024-06-17 DIAGNOSIS — M79.89 LEFT LEG SWELLING: ICD-10-CM

## 2024-06-17 DIAGNOSIS — M79.605 LEFT LEG PAIN: ICD-10-CM

## 2024-06-17 PROCEDURE — 93971 EXTREMITY STUDY: CPT | Mod: 26,LT,, | Performed by: RADIOLOGY

## 2024-06-17 PROCEDURE — 73630 X-RAY EXAM OF FOOT: CPT | Mod: 26,LT,, | Performed by: RADIOLOGY

## 2024-06-17 PROCEDURE — 93971 EXTREMITY STUDY: CPT | Mod: TC,LT

## 2024-06-17 PROCEDURE — 73630 X-RAY EXAM OF FOOT: CPT | Mod: TC,FY,LT

## 2024-06-17 PROCEDURE — 73610 X-RAY EXAM OF ANKLE: CPT | Mod: 26,LT,, | Performed by: RADIOLOGY

## 2024-06-17 PROCEDURE — 73610 X-RAY EXAM OF ANKLE: CPT | Mod: TC,FY,LT

## 2024-06-17 NOTE — TELEPHONE ENCOUNTER
I was unable to schedule an appointment, I sent a message to endo department for someone to call patient to schedule

## 2024-06-27 ENCOUNTER — OFFICE VISIT (OUTPATIENT)
Dept: PODIATRY | Facility: CLINIC | Age: 69
End: 2024-06-27
Payer: MEDICARE

## 2024-06-27 VITALS
HEART RATE: 60 BPM | SYSTOLIC BLOOD PRESSURE: 109 MMHG | HEIGHT: 64 IN | BODY MASS INDEX: 20.4 KG/M2 | WEIGHT: 119.5 LBS | DIASTOLIC BLOOD PRESSURE: 60 MMHG

## 2024-06-27 DIAGNOSIS — M79.605 LEFT LEG PAIN: ICD-10-CM

## 2024-06-27 DIAGNOSIS — M79.89 LEFT LEG SWELLING: ICD-10-CM

## 2024-06-27 DIAGNOSIS — M79.672 CHRONIC FOOT PAIN, LEFT: ICD-10-CM

## 2024-06-27 DIAGNOSIS — M25.572 CHRONIC PAIN OF LEFT ANKLE: Primary | ICD-10-CM

## 2024-06-27 DIAGNOSIS — G89.29 CHRONIC FOOT PAIN, LEFT: ICD-10-CM

## 2024-06-27 DIAGNOSIS — G89.29 CHRONIC PAIN OF LEFT ANKLE: Primary | ICD-10-CM

## 2024-06-27 PROCEDURE — 99999 PR PBB SHADOW E&M-EST. PATIENT-LVL III: CPT | Mod: PBBFAC,,, | Performed by: PODIATRIST

## 2024-06-27 PROCEDURE — 99213 OFFICE O/P EST LOW 20 MIN: CPT | Mod: PBBFAC,PN | Performed by: PODIATRIST

## 2024-06-27 PROCEDURE — 99212 OFFICE O/P EST SF 10 MIN: CPT | Mod: S$PBB,,, | Performed by: PODIATRIST

## 2024-06-27 NOTE — PROGRESS NOTES
Subjective:      Patient ID: Fior Moreland is a 68 y.o. female.    Chief Complaint: Follow-up (X-ray results)      Cc longstanding 10 years or greater intermittent minor pain of the left foot and ankle.  Was evaluated about 10 years ago and x-rays with surgery recommended which she declined.  Been most part, pain has resolved entirely but occasionally when she carry something heavy she gets minor discomfort that has generalized throughout the ankle and foot left with distention of the veins at the left ankle.  Denies repeat trauma left foot and ankle, no self-treatment, no medical treatment.    Ultrasound left lower extremity last visit negative for thrombosis.      X-rays of the left foot and ankle last visit negative for acute osseous abnormality.    Review of Systems   Constitutional: Negative for chills, diaphoresis, fever, malaise/fatigue and night sweats.   Cardiovascular:  Positive for leg swelling (Intermittent left only). Negative for claudication, cyanosis and syncope.   Skin:  Positive for nail changes. Negative for color change, dry skin, rash, suspicious lesions and unusual hair distribution.   Musculoskeletal:  Positive for joint pain (Intermittent left ankle only.). Negative for falls, joint swelling, muscle cramps, muscle weakness and stiffness.   Gastrointestinal:  Negative for constipation, diarrhea, nausea and vomiting.   Neurological:  Negative for brief paralysis, disturbances in coordination, focal weakness, numbness, paresthesias, sensory change and tremors.           Objective:      Physical Exam  Constitutional:       General: She is not in acute distress.     Appearance: She is well-developed. She is not diaphoretic.   Cardiovascular:      Pulses:           Popliteal pulses are 2+ on the right side and 2+ on the left side.        Dorsalis pedis pulses are 2+ on the right side and 2+ on the left side.        Posterior tibial pulses are 2+ on the right side and 2+ on the left side.       Comments: Capillary refill 3 seconds all toes/distal feet, all toes/both feet warm to touch.      Negative lymphadenopathy bilateral popliteal fossa and tarsal tunnel.     No current edema right or left lower extremity  Musculoskeletal:      Right ankle: No swelling, deformity, ecchymosis or lacerations. Normal range of motion. Normal pulse.      Right Achilles Tendon: Normal. No defects. Gongora's test negative.      Comments: No diffuse or focal pain to palpation range of motion foot and ankle of the left today or right.    Ankle bilateral has negative anterior drawer sign, negative posterior drawer sign, negative external rotation test, negative squeeze test.    Normal angle, base, station of gait. All ten toes without clubbing, cyanosis, or signs of ischemia.  No pain to palpation bilateral lower extremities.  Range of motion, stability, muscle strength, and muscle tone normal bilateral feet and legs.    Lymphadenopathy:      Lower Body: No right inguinal adenopathy. No left inguinal adenopathy.      Comments: Negative lymphadenopathy bilateral popliteal fossa and tarsal tunnel.    Negative lymphangitic streaking bilateral feet/ankles/legs.   Skin:     General: Skin is warm and dry.      Capillary Refill: Capillary refill takes 2 to 3 seconds.      Coloration: Skin is not pale.      Findings: No abrasion, bruising, burn, ecchymosis, erythema, laceration, lesion or rash.      Nails: There is no clubbing.      Comments:   Skin is normal age and health appropriate color, turgor, texture, and temperature bilateral lower extremities without ulceration, hyperpigmentation, discoloration, masses nodules or cords palpated.  No ecchymosis, erythema, edema, or cardinal signs of infection bilateral lower extremities.    All 10 toenails are mildly yellowed and minimally thickened without several notable debris without periungual skin abnormality without open skin drainage pus tracking fluctuance malodor signs of infection.    Neurological:      Mental Status: She is alert and oriented to person, place, and time.      Sensory: No sensory deficit.      Motor: No tremor, atrophy or abnormal muscle tone.      Gait: Gait normal.      Comments: Negative tinel sign to percussion sural, superficial peroneal, deep peroneal, saphenous, and posterior tibial nerves right and left ankles and feet.    Negative allodynia both feet   Psychiatric:         Behavior: Behavior is cooperative.             Assessment:       Encounter Diagnoses   Name Primary?    Chronic pain of left ankle Yes    Chronic foot pain, left     Left leg swelling     Left leg pain          Plan:       Fior was seen today for follow-up.    Diagnoses and all orders for this visit:    Chronic pain of left ankle  -     Ambulatory referral/consult to Physical Medicine Rehab; Future    Chronic foot pain, left    Left leg swelling  -     Ambulatory referral/consult to Physical Medicine Rehab; Future    Left leg pain  -     Ambulatory referral/consult to Physical Medicine Rehab; Future      I counseled the patient on her conditions, their implications and medical management.        Patient would like referral to Physical Medicine rehab for potential further workup of the intermittent pain left foot and ankle which occurs intermittently just like the swelling but is worst when she lifts heavy object.      Consult PM and R.       prn.          Follow up if symptoms worsen or fail to improve.

## 2024-06-28 ENCOUNTER — TELEPHONE (OUTPATIENT)
Dept: ORTHOPEDICS | Facility: CLINIC | Age: 69
End: 2024-06-28
Payer: MEDICARE

## 2024-07-24 ENCOUNTER — HOSPITAL ENCOUNTER (OUTPATIENT)
Dept: RADIOLOGY | Facility: OTHER | Age: 69
Discharge: HOME | End: 2024-07-24
Attending: STUDENT IN AN ORGANIZED HEALTH CARE EDUCATION/TRAINING PROGRAM
Payer: MEDICARE

## 2024-07-24 DIAGNOSIS — Z12.31 ENCOUNTER FOR SCREENING MAMMOGRAM FOR BREAST CANCER: ICD-10-CM

## 2024-07-24 PROCEDURE — 77067 SCR MAMMO BI INCL CAD: CPT | Mod: 26,,, | Performed by: RADIOLOGY

## 2024-07-24 PROCEDURE — 77067 SCR MAMMO BI INCL CAD: CPT | Mod: TC

## 2024-07-24 PROCEDURE — 77063 BREAST TOMOSYNTHESIS BI: CPT | Mod: 26,,, | Performed by: RADIOLOGY

## 2024-08-22 ENCOUNTER — OFFICE VISIT (OUTPATIENT)
Dept: PHYSICAL MEDICINE AND REHAB | Facility: CLINIC | Age: 69
End: 2024-08-22
Payer: MEDICARE

## 2024-08-22 VITALS — BODY MASS INDEX: 20.11 KG/M2 | WEIGHT: 117.81 LBS | OXYGEN SATURATION: 98 % | HEIGHT: 64 IN

## 2024-08-22 DIAGNOSIS — M25.572 CHRONIC PAIN OF LEFT ANKLE: Primary | ICD-10-CM

## 2024-08-22 DIAGNOSIS — M79.605 LEFT LEG PAIN: ICD-10-CM

## 2024-08-22 DIAGNOSIS — G89.29 CHRONIC PAIN OF LEFT ANKLE: Primary | ICD-10-CM

## 2024-08-22 PROCEDURE — 99213 OFFICE O/P EST LOW 20 MIN: CPT | Mod: PBBFAC | Performed by: PHYSICAL MEDICINE & REHABILITATION

## 2024-08-22 PROCEDURE — 99999 PR PBB SHADOW E&M-EST. PATIENT-LVL III: CPT | Mod: PBBFAC,,, | Performed by: PHYSICAL MEDICINE & REHABILITATION

## 2024-08-22 PROCEDURE — 99203 OFFICE O/P NEW LOW 30 MIN: CPT | Mod: S$PBB,,, | Performed by: PHYSICAL MEDICINE & REHABILITATION

## 2024-08-22 NOTE — PROGRESS NOTES
"Subjective:       Patient ID: Fior Moreland is a 68 y.o. female.    Chief Complaint: No chief complaint on file.      HPI      Mrs. Moreland is a 68-year-old female with past medical history of prediabetes, osteoporosis, chronic left foot and ankle pain.  He was referred to the [facility] by Podiatry for help with chronic left ankle and foot pain.  He was seen earlier today by Dr. Cerda from Podiatry in no acute interventions were deemed necessary.      The patient reports that she sustained bad sprain of her left ankle about years ago when she was lifting something heavy.  She has significant pain and swelling and was diagnosed with bad ankle sprain.  He was treated conservatively.  Her symptoms nearly subsided but they flare up every now and then.  During her flare ups, she uses ice and elevates her foot.  Her last flare-up was in mid 06/2024.  She was evaluated by Podiatry on 6/27/2024.  X-rays at that time were negative for fractures or OA.  Ultrasound was negative for DVT.  He was referred to the Physical Medicine Clinic for help with conservative pain management.  Since her visit to Podiatry, she reports that her left ankle pain subsided.    Currently, her left ankle pain is under good control.  Is mostly around the left lateral malleolus.  It is aggravated mostly by heavy lifting (such as carrying her 25 lb grandchild) missing a step or twisting her foot.  It is better with rest, ice, compression and elevation.  Her maximum pain is 5-6/10 and minimum 0/10.  Today it is 0/10.  She denies any associated back pain.  She denies any swelling or warmth of her ankle.    She is currently not taking any medications for her pain.  If you of the chart shows no significant impairment of BUN and creatinine.      Past Medical History:   Diagnosis Date    Miscarriage 1984        Review of patient's allergies indicates:   Allergen Reactions    Ciprofloxacin Other (See Comments)     Caused "neuro problem, " "felt like I was going to have a seizure."     Sulfa (sulfonamide antibiotics) Other (See Comments)        Review of Systems   Constitutional:  Negative for chills and fever.   Eyes:  Negative for visual disturbance.   Respiratory:  Negative for shortness of breath.    Cardiovascular:  Negative for chest pain.   Gastrointestinal:  Negative for blood in stool, nausea and vomiting.   Genitourinary:  Negative for difficulty urinating.   Musculoskeletal:  Positive for arthralgias. Negative for back pain, gait problem and neck pain.   Neurological:  Negative for dizziness, weakness and headaches.   Psychiatric/Behavioral:  Negative for behavioral problems.              Objective:      Physical Exam  Vitals reviewed.   Constitutional:       Appearance: She is well-developed.   HENT:      Head: Normocephalic and atraumatic.   Eyes:      Extraocular Movements: Extraocular movements intact.   Musculoskeletal:      Cervical back: Normal range of motion. No tenderness.      Comments: BLE:  ROM:   RLE: full.   LLE: full.  Knee crepitus:   RLE: -ve.   LLE: -ve.   Strength:    RLE: 5/5 at hip flexion, 5 knee extension, 5 ankle DF, 5 PF.   LLE: 5/5 at hip flexion, 5 knee extension, 5 ankle DF, 5 PF.  Sensation to pinprick:     RLE: intact.      LLE: intact.     Left Ankle:  Anterior talofibular ligament: +ve mild tenderness  Deltoid ligament:: -ve tenderness  -ve anterior-posterior instability  -ve pain on ankle inversion  -ve pain on ankle eversion    Gait: WNL     Skin:     General: Skin is warm.   Neurological:      General: No focal deficit present.      Mental Status: She is alert.   Psychiatric:         Behavior: Behavior normal.           IMAGING STUDIES:    XR FOOT COMPLETE 3 VIEW LEFT (6/17/2024):     CLINICAL HISTORY:  .  Other specified soft tissue disorders     TECHNIQUE:  AP, lateral and oblique views of the left foot were performed.     COMPARISON:  None     FINDINGS:  No acute fracture or dislocation seen.   "   Plantar calcaneal spur.     No significant soft tissue edema or radiopaque retained foreign body.     Impression:     No acute osseous abnormality seen.        Electronically signed by:Sanjuanita Lundy      Assessment:       1. Chronic pain of left ankle    2. Left leg pain        Plan:       - She was asked again to be mindful of PRICE when her ankle pain flares up (protection, rest, ice, compression and elevation).  He was offered a prescription for meloxicam to use when her ankle pain flares up but prefers to avoid prescription medications.  She was told she may take OTC naproxen 220 mg, 1-2 tablets by mouth twice per day as needed.  - Follow up in about 4 months (around 12/22/2024).    This was a 30 minute visit, 50% of which was spent educating the patient about the diagnosis and the treatment plan.    This note was partly generated with [employer] voice recognition software. I apologize for any possible typographical errors.

## 2024-09-10 ENCOUNTER — OFFICE VISIT (OUTPATIENT)
Dept: DERMATOLOGY | Facility: CLINIC | Age: 69
End: 2024-09-10
Payer: MEDICARE

## 2024-09-10 DIAGNOSIS — D23.9 DERMATOFIBROMA: ICD-10-CM

## 2024-09-10 DIAGNOSIS — L82.1 SEBORRHEIC KERATOSIS: ICD-10-CM

## 2024-09-10 DIAGNOSIS — L57.0 AK (ACTINIC KERATOSIS): Primary | ICD-10-CM

## 2024-09-10 DIAGNOSIS — D23.9 INTRADERMAL NEVUS: ICD-10-CM

## 2024-09-10 PROCEDURE — 99999 PR PBB SHADOW E&M-EST. PATIENT-LVL II: CPT | Mod: PBBFAC,,, | Performed by: STUDENT IN AN ORGANIZED HEALTH CARE EDUCATION/TRAINING PROGRAM

## 2024-09-10 PROCEDURE — 99203 OFFICE O/P NEW LOW 30 MIN: CPT | Mod: S$PBB,,, | Performed by: STUDENT IN AN ORGANIZED HEALTH CARE EDUCATION/TRAINING PROGRAM

## 2024-09-10 PROCEDURE — 99212 OFFICE O/P EST SF 10 MIN: CPT | Mod: PBBFAC | Performed by: STUDENT IN AN ORGANIZED HEALTH CARE EDUCATION/TRAINING PROGRAM

## 2024-09-10 NOTE — PROGRESS NOTES
Tanja Duffy RD    4/17/2023    [place] [place]    GESTATIONAL DIABETES HOME INSTRUCTIONS      Eat Healthy:   Frequency of meals and snacks    1. Three small meals and 3 snacks.  Eat something every 2 - 3 hours.    2. No more than ten hours between bedtime snack and breakfast.       Count the carbohydrates you eat at one sitting:    Breakfast:  30 to 45 grams or 2 to 3 carb choices        Lunch:   45 to 60 grams or 3 to 4 carb choices    Dinner:  45 to 60 grams or 3 to 4 carb choices    Snacks: 15 to 30 grams or 1 to 2 carb choices      Some protein (meat, cheese, eggs, peanut butter, nuts) at each meal and snack is recommended.    Certain carbohydrates, especially fruit juices and milk, can quickly raise blood sugar.    Avoid drinking fruit juices, regular soda, sports drinks, and sweetened beverages, such as lemonade or sweetened teas.   Limit fried and fatty foods.  Label reading can be helpful    Be Physically Active:  Check with your doctor or nurse midwife before starting your exercise plan.  A walk after your meal will help lower your blood sugar.    Check and Record Blood Sugar 4 Times Each Day:  Timing/Targets:  Fasting/Before breakfast:  Less than 95, higher than 60  Two hours after each meal (timed with the first bite):  less than 120  If you test one hour after a meal:   less than 130    Contact Your Diabetes Educator:     With blood sugar readings every 3 to 7 days for review or for any related issues.  (Bring your blood glucose meter, supplies, record book and written materials to your  Doctor/education appointments.)    Complete 6 to 12 week post delivery Glucose Tolerance Test.    Thank You,   CORTEZ Gandhi: [phone]  [facility]     Gestational Diabetes Weekly Follow-Up Instructions   Please update with blood sugar logs on a weekly basis.   Three Options for Communication   1. Email blood sugar results to our secure email address - [email or web address] a. Response will be via    Subjective:      Patient ID:  Fior Moreland is a 68 y.o. female who presents for   Chief Complaint   Patient presents with    Skin Check     Face    Lesion     R lower leg     Fior Moreland is a 68 y.o. female who presents for: evaluation of skin lesions.    New patient    The patient has the following lesions of concern:  Location: spot by L eye  Duration: 5-6 mos  Symptoms: rapidly growing, not tender  Relieving factors/Previous treatments: none    Patient with new area of concern:   Location: spot on upper lip  Duration: 6-7 yrs  Symptoms: seems to be getting smaller  Previous treatments: none     Patient with new area of concern:   Location: R lower leg  Previous treatments: shaves off     Pertinent history:  History of blistering sunburns: Yes  History of tanning bed use: No  Family history of melanoma: Unsure - thinks uncle and brother  Personal history of mole removal: No  Personal history of skin cancer: No         Review of Systems   Skin:  Positive for daily sunscreen use (Face daily) and activity-related sunscreen use. Negative for recent sunburn.   Hematologic/Lymphatic: Does not bruise/bleed easily.       Objective:   Physical Exam   Skin:   Areas Examined (abnormalities noted in diagram):   Head / Face Inspection Performed  RLE Inspected                Diagram Legend     Erythematous scaling macule/papule c/w actinic keratosis       Vascular papule c/w angioma      Pigmented verrucoid papule/plaque c/w seborrheic keratosis      Yellow umbilicated papule c/w sebaceous hyperplasia      Irregularly shaped tan macule c/w lentigo     1-2 mm smooth white papules consistent with Milia      Movable subcutaneous cyst with punctum c/w epidermal inclusion cyst      Subcutaneous movable cyst c/w pilar cyst      Firm pink to brown papule c/w dermatofibroma      Pedunculated fleshy papule(s) c/w skin tag(s)      Evenly pigmented macule c/w junctional nevus     Mildly variegated pigmented, slightly  [employer] or telephone if preferred       2. Send blood sugar results through [employer] with [facility] audie or website a. How do I send a message to my provider? i. From the Communication menu in the [employer] website, select Message Center   ii. Select Medical Question, choose your topic and choose your provider. From the My Chart screen in the [employer] audie, select Messages and Message a provider   iii. Compose a message and click or tap Send. 1. Text can be copied into the message or attached as a picture ('attach image' button is at the bottom of the screen)       b. With each new weekly update, please start a NEW message rather than replying to the previous week       3. Drop off blood sugar results or have blood sugar meter downloaded in [address]     Please include the following in each update   4. Daily blood sugars and please indicate the following - fasting, 2 hours post breakfast, lunch, and dinner   5. Current diabetes medications, if applicable   6. Diet/intake log   7. Any additional explanations about unique activity or stressors      irregular-bordered macule c/w mildly atypical nevus      Flesh colored to evenly pigmented papule c/w intradermal nevus       Pink pearly papule/plaque c/w basal cell carcinoma      Erythematous hyperkeratotic cursted plaque c/w SCC      Surgical scar with no sign of skin cancer recurrence      Open and closed comedones      Inflammatory papules and pustules      Verrucoid papule consistent consistent with wart     Erythematous eczematous patches and plaques     Dystrophic onycholytic nail with subungual debris c/w onychomycosis     Umbilicated papule    Erythematous-base heme-crusted tan verrucoid plaque consistent with inflamed seborrheic keratosis     Erythematous Silvery Scaling Plaque c/w Psoriasis     See annotation    Pt declines FBSE today    Assessment / Plan:        AK (actinic keratosis)  R upper cutaneous lip  Discussed favored diagnosis of actinic keratosis and offered treatment with cryotherapy , reviewed precancerous nature of lesion  Pt has a lot going on right now with 's heart failure- defers treatment today and agrees to return for any growth, change, bleeding, pain    Seborrheic keratosis  These are benign inherited growths without a malignant potential. Reassurance given to patient. No treatment is necessary.   If symptomatic can treat with cryotherapy or shave removal  Pt defers procedures today- will contact office for any bleeding, growth, pain    Intradermal nevus  Reassurance  Discussed importance of sun protection SPF 30+ daily and wide-brimmed hats  Annual skin exams encouraged    Dermatofibroma  This is a benign scar-like lesion secondary to minor trauma. No treatment required.     RTC 1 year, sooner prn

## 2024-11-05 ENCOUNTER — OFFICE VISIT (OUTPATIENT)
Dept: OBSTETRICS AND GYNECOLOGY | Facility: CLINIC | Age: 69
End: 2024-11-05
Payer: MEDICARE

## 2024-11-05 VITALS — BODY MASS INDEX: 19.9 KG/M2 | WEIGHT: 115.94 LBS | DIASTOLIC BLOOD PRESSURE: 62 MMHG | SYSTOLIC BLOOD PRESSURE: 98 MMHG

## 2024-11-05 DIAGNOSIS — N95.2 VAGINAL ATROPHY: ICD-10-CM

## 2024-11-05 DIAGNOSIS — Z01.419 WELL WOMAN EXAM WITH ROUTINE GYNECOLOGICAL EXAM: Primary | ICD-10-CM

## 2024-11-05 PROCEDURE — G0101 CA SCREEN;PELVIC/BREAST EXAM: HCPCS | Mod: PBBFAC | Performed by: STUDENT IN AN ORGANIZED HEALTH CARE EDUCATION/TRAINING PROGRAM

## 2024-11-05 PROCEDURE — 99999 PR PBB SHADOW E&M-EST. PATIENT-LVL III: CPT | Mod: PBBFAC,,, | Performed by: STUDENT IN AN ORGANIZED HEALTH CARE EDUCATION/TRAINING PROGRAM

## 2024-11-05 PROCEDURE — 99213 OFFICE O/P EST LOW 20 MIN: CPT | Mod: PBBFAC | Performed by: STUDENT IN AN ORGANIZED HEALTH CARE EDUCATION/TRAINING PROGRAM

## 2024-11-05 RX ORDER — ESTRADIOL 0.1 MG/G
CREAM VAGINAL
Qty: 42.5 G | Refills: 1 | Status: SHIPPED | OUTPATIENT
Start: 2024-11-05

## 2024-11-05 NOTE — PROGRESS NOTES
"History & Physical  Gynecology      SUBJECTIVE:     Chief Complaint: Well Woman       History of Present Illness:  Annual exam. No complaints. Her partner is having heart issues and will need surgery in December. She admits she has been drinking more alcohol and eating less food than before. She has lost 10lbs since , but reports that her partner doesn't like the taste of food without salt and so she feels cooking, etc is a waste. So, she ends up eating small things then drinking two glasses of wine. She knows she should work on cutting back, but reports she is very stressed at this time  Menstrual History: menopausal 50s. HRT for short time.   Obstetric Hx: .   Sexually Active: occasional with one male partner  Family history: breast cancer in mother in her mid 50s. Paternal cousin with Bca in early 30s  Social: Wears seatbelts. Exercises. Feels safe at home.   Last pap: adequate screening was always normal  Last mammo: 2024  Colonoscopy:2024  DEXA:  osteoporosis  Covid vaccine utd  Vitamins: taking      Review of patient's allergies indicates:   Allergen Reactions    Ciprofloxacin Other (See Comments)     Caused "neuro problem, felt like I was going to have a seizure."     Sulfa (sulfonamide antibiotics) Other (See Comments)       Past Medical History:   Diagnosis Date    Miscarriage      Past Surgical History:   Procedure Laterality Date    APPENDECTOMY      BREAST BIOPSY      BREAST SURGERY       OB History          4    Para   1    Term   1            AB   3    Living             SAB   3    IAB        Ectopic        Multiple        Live Births                   Family History   Problem Relation Name Age of Onset    Breast cancer Mother      Stroke Father      Breast cancer Paternal Grandmother       Social History     Tobacco Use    Smoking status: Never    Smokeless tobacco: Never    Tobacco comments:     40 years ago smoker   Substance Use Topics    Alcohol " use: Yes     Comment: everyday    Drug use: Not Currently       Current Outpatient Medications   Medication Sig    calcium carbonate-vitamin D3 600 mg-20 mcg (800 unit) Chew     cholecalciferol, vitamin D3, 125 mcg (5,000 unit) Tab     ibandronate (BONIVA) 150 mg tablet Take 1 tablet (150 mg total) by mouth every 30 days.    mv-min-folic acid-lutein (CENTRUM SILVER) 400-250 mcg Chew     estradioL (ESTRACE) 0.01 % (0.1 mg/gram) vaginal cream Apply pea-sized amount nightly for two weeks, then twice a week there after     No current facility-administered medications for this visit.         Review of Systems:  Review of Systems   Constitutional:  Negative for activity change, appetite change, fever and unexpected weight change.   Respiratory:  Negative for shortness of breath.    Cardiovascular:  Negative for chest pain.   Gastrointestinal:  Negative for abdominal pain, blood in stool, constipation, diarrhea, nausea and vomiting.   Endocrine: Negative for hot flashes.   Genitourinary:  Positive for vaginal dryness. Negative for dysuria, frequency, hematuria, hot flashes, pelvic pain, urgency, vaginal bleeding, vaginal discharge, vaginal pain, urinary incontinence and postmenopausal bleeding.   Integumentary:  Negative for breast mass.   Psychiatric/Behavioral:  Negative for sleep disturbance.    Breast: Negative for lump and mass       OBJECTIVE:     Physical Exam:  Physical Exam  Vitals reviewed.   Constitutional:       General: She is not in acute distress.     Appearance: She is well-developed. She is not diaphoretic.   HENT:      Head: Normocephalic and atraumatic.   Eyes:      General: No scleral icterus.        Right eye: No discharge.         Left eye: No discharge.      Conjunctiva/sclera: Conjunctivae normal.   Neck:      Thyroid: No thyromegaly.   Cardiovascular:      Rate and Rhythm: Normal rate.   Pulmonary:      Effort: Pulmonary effort is normal.   Chest:   Breasts:     Breasts are symmetrical.       Right: No inverted nipple, mass, nipple discharge, skin change or tenderness.      Left: No inverted nipple, mass, nipple discharge, skin change or tenderness.   Abdominal:      General: There is no distension.      Palpations: Abdomen is soft.      Tenderness: There is no abdominal tenderness.   Genitourinary:     Labia:         Right: No rash, tenderness, lesion or injury.         Left: No rash, tenderness, lesion or injury.       Vagina: Normal. No signs of injury and foreign body. No vaginal discharge, erythema, tenderness or bleeding.      Cervix: No cervical motion tenderness, discharge or friability.      Uterus: Not deviated, not enlarged, not fixed and not tender.       Adnexa:         Right: No mass, tenderness or fullness.          Left: No mass, tenderness or fullness.     Musculoskeletal:         General: Normal range of motion.      Cervical back: Normal range of motion and neck supple.   Lymphadenopathy:      Cervical: No cervical adenopathy.   Skin:     General: Skin is warm and dry.      Findings: No erythema or rash.   Neurological:      Mental Status: She is alert and oriented to person, place, and time.       ASSESSMENT:       ICD-10-CM ICD-9-CM    1. Well woman exam with routine gynecological exam  Z01.419 V72.31       2. Vaginal atrophy  N95.2 627.3                Plan:      WWE  - Postmenopausal. Premarin sent for dryness  - Vaccines utd  - Labs utd  - DEXA, Mammogram, Colonoscopy utd  - Adequate screening with pap smear has been normal. Discussed with patient that she no longer requires screening based on ASCCP guidelines.   - Vitamin D and Calcium taking  - CBE normal. Physical exam normal. VSS     Counseling time: 15 minutes    Samra Fuentes

## 2025-02-01 ENCOUNTER — OFFICE VISIT (OUTPATIENT)
Dept: URGENT CARE | Facility: CLINIC | Age: 70
End: 2025-02-01
Payer: MEDICARE

## 2025-02-01 ENCOUNTER — NURSE TRIAGE (OUTPATIENT)
Dept: ADMINISTRATIVE | Facility: CLINIC | Age: 70
End: 2025-02-01
Payer: MEDICARE

## 2025-02-01 VITALS
WEIGHT: 115.94 LBS | HEIGHT: 64 IN | HEART RATE: 65 BPM | RESPIRATION RATE: 16 BRPM | OXYGEN SATURATION: 98 % | SYSTOLIC BLOOD PRESSURE: 121 MMHG | BODY MASS INDEX: 19.79 KG/M2 | DIASTOLIC BLOOD PRESSURE: 71 MMHG | TEMPERATURE: 99 F

## 2025-02-01 DIAGNOSIS — J06.9 URI, ACUTE: ICD-10-CM

## 2025-02-01 DIAGNOSIS — J02.9 SORE THROAT: Primary | ICD-10-CM

## 2025-02-01 LAB
CTP QC/QA: YES
MOLECULAR STREP A: NEGATIVE

## 2025-02-01 PROCEDURE — 87651 STREP A DNA AMP PROBE: CPT | Mod: QW,S$GLB,, | Performed by: FAMILY MEDICINE

## 2025-02-01 PROCEDURE — 99213 OFFICE O/P EST LOW 20 MIN: CPT | Mod: S$GLB,,, | Performed by: FAMILY MEDICINE

## 2025-02-01 RX ORDER — CETIRIZINE HYDROCHLORIDE 10 MG/1
10 TABLET ORAL DAILY
Qty: 7 TABLET | Refills: 0 | Status: SHIPPED | OUTPATIENT
Start: 2025-02-01 | End: 2025-02-08

## 2025-02-01 RX ORDER — FLUTICASONE PROPIONATE 50 MCG
1 SPRAY, SUSPENSION (ML) NASAL 2 TIMES DAILY
Qty: 16 G | Refills: 0 | Status: SHIPPED | OUTPATIENT
Start: 2025-02-01 | End: 2025-02-08

## 2025-02-01 NOTE — TELEPHONE ENCOUNTER
Speaking with pt who reports that she is having sore throat pain. States that when she ingests anything with sugar she has sharp pain, mainly noted to left side of throat. Denies fever at this time. Advised to be seen by provider within 24 hours. Advised can be seen in ED/UC. ODD VV. Appointment also offered. Reason for Disposition   SEVERE (e.g., excruciating) throat pain    Additional Information   Negative: SEVERE difficulty breathing (e.g., struggling for each breath, speaks in single words, stridor)   Negative: Sounds like a life-threatening emergency to the triager   Negative: [1] Drooling or spitting out saliva (because can't swallow) AND [2] normal breathing   Negative: Unable to open mouth completely   Negative: [1] Difficulty breathing AND [2] not severe   Negative: Fever > 104 F (40 C)   Negative: [1] Refuses to drink anything AND [2] for > 12 hours   Negative: [1] Drinking very little AND [2] dehydration suspected (e.g., no urine > 12 hours, very dry mouth, very lightheaded)   Negative: Patient sounds very sick or weak to the triager    Protocols used: Sore Throat-A-AH

## 2025-02-01 NOTE — PROGRESS NOTES
Subjective:      Patient ID: Fior Moreland is a 69 y.o. female.      Chief Complaint: Sore Throat (With a cough associated. )    Patient reports with a sore throat and a cough that started Friday of last week. Taking cold ease and tylenol cold and flu to help combat her symptoms however, with some relief.    Sore Throat   This is a new problem. The current episode started 1 to 4 weeks ago. The problem has been gradually worsening. Neither side of throat is experiencing more pain than the other. There has been no fever. The pain is at a severity of 4/10. The pain is mild. Associated symptoms include congestion, coughing, ear pain, headaches, a hoarse voice and trouble swallowing. Pertinent negatives include no abdominal pain, diarrhea, drooling, ear discharge, plugged ear sensation, neck pain, shortness of breath, stridor, swollen glands or vomiting. She has had no exposure to strep or mono. She has tried acetaminophen for the symptoms. The treatment provided moderate relief.       HENT:  Positive for ear pain, congestion, sore throat and trouble swallowing. Negative for ear discharge and drooling.    Neck: Negative for neck pain.   Respiratory:  Positive for cough. Negative for shortness of breath and stridor.    Gastrointestinal:  Negative for abdominal pain, vomiting and diarrhea.   Neurological:  Positive for headaches.      Objective:     Physical Exam   Constitutional: She is oriented to person, place, and time.  Non-toxic appearance. She does not appear ill. No distress.   HENT:   Head: Atraumatic.   Ears:      Comments: Middle ear effusion bilaterally without bulging of tympanic membrane  Nose: Congestion present.   Mouth/Throat: Posterior oropharyngeal erythema present. No oropharyngeal exudate.   Eyes: Conjunctivae are normal.   Neck: Neck supple.   Cardiovascular: Normal rate, regular rhythm, normal heart sounds and normal pulses.   Pulmonary/Chest: Effort normal and breath sounds normal.    Lymphadenopathy:     She has no cervical adenopathy.   Neurological: She is alert and oriented to person, place, and time.   Skin: Skin is not diaphoretic.   Psychiatric: Mood, judgment and thought content normal.     Results for orders placed or performed in visit on 02/01/25   POCT Strep A, Molecular    Collection Time: 02/01/25 12:16 PM   Result Value Ref Range    Molecular Strep A, POC Negative Negative     Acceptable Yes         Assessment:     1. Sore throat    2. URI, acute        Plan:       Sore throat  -     POCT Strep A, Molecular  -     Ambulatory referral/consult to ENT if persistent symptoms    2. URI, acute  -     fluticasone propionate (FLONASE) 50 mcg/actuation nasal spray; 1 spray (50 mcg total) by Each Nostril route 2 (two) times a day. for 7 days  Dispense: 16 g; Refill: 0  -     cetirizine (ZYRTEC) 10 MG tablet; Take 1 tablet (10 mg total) by mouth once daily. for 7 days  Dispense: 7 tablet; Refill: 0    Patient Instructions   I have placed ENT referral  Call to schedule an appointment: [phone]

## 2025-02-18 ENCOUNTER — OFFICE VISIT (OUTPATIENT)
Dept: DERMATOLOGY | Facility: CLINIC | Age: 70
End: 2025-02-18
Payer: MEDICARE

## 2025-02-18 DIAGNOSIS — L82.0 INFLAMED SEBORRHEIC KERATOSIS OF LEFT CHEEK: Primary | ICD-10-CM

## 2025-02-18 DIAGNOSIS — R20.9 SKIN SENSATION DISTURBANCE: ICD-10-CM

## 2025-02-18 PROCEDURE — 17110 DESTRUCTION B9 LES UP TO 14: CPT | Mod: PBBFAC | Performed by: STUDENT IN AN ORGANIZED HEALTH CARE EDUCATION/TRAINING PROGRAM

## 2025-02-18 PROCEDURE — 99212 OFFICE O/P EST SF 10 MIN: CPT | Mod: PBBFAC | Performed by: STUDENT IN AN ORGANIZED HEALTH CARE EDUCATION/TRAINING PROGRAM

## 2025-02-18 NOTE — PROGRESS NOTES
Subjective:      Patient ID:  Fior Moreland is a 69 y.o. female who presents for No chief complaint on file.    Fior Moreland is a 69 y.o. female who presents for: evaluation of skin lesions.    Last office visit 9/10/2024 with me     The patient has the following lesions of concern:  Location: skin tag on left side of nose   Duration: months   Symptoms: wants removed, growing and rubs on glasses  Relieving factors/Previous treatments: none     Pertinent history:  History of blistering sunburns: Yes  History of tanning bed use: No  Family history of melanoma: Unsure - thinks uncle and brother  Personal history of mole removal: No  Personal history of skin cancer: No     Patient notes her grandmother has a history of scleroderma  Her  passed away since I saw her last visit      Review of Systems   Skin:  Positive for daily sunscreen use and activity-related sunscreen use. Negative for recent sunburn.   Hematologic/Lymphatic: Does not bruise/bleed easily.       Objective:   Physical Exam   Skin:   Areas Examined (abnormalities noted in diagram):   Head / Face Inspection Performed            Diagram Legend     Erythematous scaling macule/papule c/w actinic keratosis       Vascular papule c/w angioma      Pigmented verrucoid papule/plaque c/w seborrheic keratosis      Yellow umbilicated papule c/w sebaceous hyperplasia      Irregularly shaped tan macule c/w lentigo     1-2 mm smooth white papules consistent with Milia      Movable subcutaneous cyst with punctum c/w epidermal inclusion cyst      Subcutaneous movable cyst c/w pilar cyst      Firm pink to brown papule c/w dermatofibroma      Pedunculated fleshy papule(s) c/w skin tag(s)      Evenly pigmented macule c/w junctional nevus     Mildly variegated pigmented, slightly irregular-bordered macule c/w mildly atypical nevus      Flesh colored to evenly pigmented papule c/w intradermal nevus       Pink pearly papule/plaque c/w basal cell carcinoma       Erythematous hyperkeratotic cursted plaque c/w SCC      Surgical scar with no sign of skin cancer recurrence      Open and closed comedones      Inflammatory papules and pustules      Verrucoid papule consistent consistent with wart     Erythematous eczematous patches and plaques     Dystrophic onycholytic nail with subungual debris c/w onychomycosis     Umbilicated papule    Erythematous-base heme-crusted tan verrucoid plaque consistent with inflamed seborrheic keratosis     Erythematous Silvery Scaling Plaque c/w Psoriasis     See annotation      Assessment / Plan:        Inflamed seborrheic keratosis of left cheek  Skin sensation disturbance  Cryosurgery procedure note:    Verbal consent from the patient is obtained including, but not limited to, risk of hypopigmentation/hyperpigmentation, scar, recurrence of lesion. Liquid nitrogen cryosurgery is applied to 1 lesions to produce a freeze injury. The patient is aware that blisters may form and is instructed on wound care with gentle cleansing and use of vaseline ointment to keep moist until healed. The patient is  instructed to call if lesions do not completely resolve.    RTC September 2025 FBSE

## 2025-02-22 DIAGNOSIS — Z00.00 ENCOUNTER FOR MEDICARE ANNUAL WELLNESS EXAM: ICD-10-CM

## 2025-03-06 ENCOUNTER — TELEPHONE (OUTPATIENT)
Dept: INTERNAL MEDICINE | Facility: CLINIC | Age: 70
End: 2025-03-06
Payer: MEDICARE

## 2025-03-06 NOTE — TELEPHONE ENCOUNTER
----- Message from Linsey sent at 3/6/2025  2:01 PM CST -----   Name of Who is Calling: What is the request in detail:  patient request call back in reference to she was looking at her stove clock with wrong time want to know if want her to still come in today Please contact to further discuss and advise  Can the clinic reply by [facility]: What Number to Call Back if not in [facility]:   [phone]

## 2025-03-06 NOTE — TELEPHONE ENCOUNTER
Attempted 2 times to contact MsGretel Merly to reschedule appt per Dr. Batista but no answer.  LVM to call the office.

## 2025-04-23 ENCOUNTER — LAB VISIT (OUTPATIENT)
Dept: LAB | Facility: OTHER | Age: 70
End: 2025-04-23
Attending: STUDENT IN AN ORGANIZED HEALTH CARE EDUCATION/TRAINING PROGRAM
Payer: MEDICARE

## 2025-04-23 ENCOUNTER — OFFICE VISIT (OUTPATIENT)
Dept: INTERNAL MEDICINE | Facility: CLINIC | Age: 70
End: 2025-04-23
Payer: MEDICARE

## 2025-04-23 VITALS
OXYGEN SATURATION: 98 % | BODY MASS INDEX: 19.2 KG/M2 | HEIGHT: 64 IN | WEIGHT: 112.44 LBS | DIASTOLIC BLOOD PRESSURE: 60 MMHG | SYSTOLIC BLOOD PRESSURE: 98 MMHG | HEART RATE: 67 BPM

## 2025-04-23 DIAGNOSIS — F43.21 GRIEF: Primary | ICD-10-CM

## 2025-04-23 DIAGNOSIS — Z91.89 AT RISK FOR BLEEDING: ICD-10-CM

## 2025-04-23 DIAGNOSIS — M81.0 AGE-RELATED OSTEOPOROSIS WITHOUT CURRENT PATHOLOGICAL FRACTURE: ICD-10-CM

## 2025-04-23 DIAGNOSIS — I49.9 IRREGULAR CARDIAC RHYTHM: ICD-10-CM

## 2025-04-23 DIAGNOSIS — R73.03 PRE-DIABETES: ICD-10-CM

## 2025-04-23 DIAGNOSIS — R00.0 TACHYCARDIA, UNSPECIFIED: ICD-10-CM

## 2025-04-23 DIAGNOSIS — E78.1 HYPERTRIGLYCERIDEMIA: ICD-10-CM

## 2025-04-23 DIAGNOSIS — Z80.3 FAMILY HISTORY OF BREAST CANCER: ICD-10-CM

## 2025-04-23 DIAGNOSIS — Z80.41 FAMILY HISTORY OF OVARIAN CANCER: ICD-10-CM

## 2025-04-23 DIAGNOSIS — R63.4 UNINTENDED WEIGHT LOSS: ICD-10-CM

## 2025-04-23 PROBLEM — B35.1 ONYCHOMYCOSIS: Status: RESOLVED | Noted: 2023-07-12 | Resolved: 2025-04-23

## 2025-04-23 LAB
ABSOLUTE EOSINOPHIL (OHS): 0.04 K/UL
ABSOLUTE MONOCYTE (OHS): 0.64 K/UL (ref 0.3–1)
ABSOLUTE NEUTROPHIL COUNT (OHS): 5.36 K/UL (ref 1.8–7.7)
ALBUMIN SERPL BCP-MCNC: 4.6 G/DL (ref 3.5–5.2)
ALP SERPL-CCNC: 75 UNIT/L (ref 40–150)
ALT SERPL W/O P-5'-P-CCNC: 18 UNIT/L (ref 10–44)
ANION GAP (OHS): 9 MMOL/L (ref 8–16)
AST SERPL-CCNC: 20 UNIT/L (ref 11–45)
BASOPHILS # BLD AUTO: 0.05 K/UL
BASOPHILS NFR BLD AUTO: 0.6 %
BILIRUB SERPL-MCNC: 0.5 MG/DL (ref 0.1–1)
BUN SERPL-MCNC: 16 MG/DL (ref 8–23)
CALCIUM SERPL-MCNC: 9.6 MG/DL (ref 8.7–10.5)
CHLORIDE SERPL-SCNC: 104 MMOL/L (ref 95–110)
CHOLEST SERPL-MCNC: 205 MG/DL (ref 120–199)
CHOLEST/HDLC SERPL: 2.7 {RATIO} (ref 2–5)
CO2 SERPL-SCNC: 25 MMOL/L (ref 23–29)
CREAT SERPL-MCNC: 0.9 MG/DL (ref 0.5–1.4)
EAG (OHS): 111 MG/DL (ref 68–131)
ERYTHROCYTE [DISTWIDTH] IN BLOOD BY AUTOMATED COUNT: 12.6 % (ref 11.5–14.5)
GFR SERPLBLD CREATININE-BSD FMLA CKD-EPI: >60 ML/MIN/1.73/M2
GLUCOSE SERPL-MCNC: 86 MG/DL (ref 70–110)
HBA1C MFR BLD: 5.5 % (ref 4–5.6)
HCT VFR BLD AUTO: 40.2 % (ref 37–48.5)
HDLC SERPL-MCNC: 76 MG/DL (ref 40–75)
HDLC SERPL: 37.1 % (ref 20–50)
HGB BLD-MCNC: 13.1 GM/DL (ref 12–16)
IMM GRANULOCYTES # BLD AUTO: 0.01 K/UL (ref 0–0.04)
IMM GRANULOCYTES NFR BLD AUTO: 0.1 % (ref 0–0.5)
LDLC SERPL CALC-MCNC: 113.6 MG/DL (ref 63–159)
LYMPHOCYTES # BLD AUTO: 2.19 K/UL (ref 1–4.8)
MAGNESIUM SERPL-MCNC: 2.1 MG/DL (ref 1.6–2.6)
MCH RBC QN AUTO: 31.1 PG (ref 27–31)
MCHC RBC AUTO-ENTMCNC: 32.6 G/DL (ref 32–36)
MCV RBC AUTO: 96 FL (ref 82–98)
NONHDLC SERPL-MCNC: 129 MG/DL
NUCLEATED RBC (/100WBC) (OHS): 0 /100 WBC
PHOSPHATE SERPL-MCNC: 3.2 MG/DL (ref 2.7–4.5)
PLATELET # BLD AUTO: 300 K/UL (ref 150–450)
PMV BLD AUTO: 10.7 FL (ref 9.2–12.9)
POTASSIUM SERPL-SCNC: 4.2 MMOL/L (ref 3.5–5.1)
PROT SERPL-MCNC: 7.8 GM/DL (ref 6–8.4)
RBC # BLD AUTO: 4.21 M/UL (ref 4–5.4)
RELATIVE EOSINOPHIL (OHS): 0.5 %
RELATIVE LYMPHOCYTE (OHS): 26.4 % (ref 18–48)
RELATIVE MONOCYTE (OHS): 7.7 % (ref 4–15)
RELATIVE NEUTROPHIL (OHS): 64.7 % (ref 38–73)
SODIUM SERPL-SCNC: 138 MMOL/L (ref 136–145)
TRIGL SERPL-MCNC: 77 MG/DL (ref 30–150)
TSH SERPL-ACNC: 0.74 UIU/ML (ref 0.4–4)
WBC # BLD AUTO: 8.29 K/UL (ref 3.9–12.7)

## 2025-04-23 PROCEDURE — 80061 LIPID PANEL: CPT

## 2025-04-23 PROCEDURE — 80053 COMPREHEN METABOLIC PANEL: CPT

## 2025-04-23 PROCEDURE — 83735 ASSAY OF MAGNESIUM: CPT

## 2025-04-23 PROCEDURE — 99999 PR PBB SHADOW E&M-EST. PATIENT-LVL III: CPT | Mod: PBBFAC,,, | Performed by: STUDENT IN AN ORGANIZED HEALTH CARE EDUCATION/TRAINING PROGRAM

## 2025-04-23 PROCEDURE — 85025 COMPLETE CBC W/AUTO DIFF WBC: CPT

## 2025-04-23 PROCEDURE — 84100 ASSAY OF PHOSPHORUS: CPT

## 2025-04-23 PROCEDURE — 99213 OFFICE O/P EST LOW 20 MIN: CPT | Mod: PBBFAC | Performed by: STUDENT IN AN ORGANIZED HEALTH CARE EDUCATION/TRAINING PROGRAM

## 2025-04-23 PROCEDURE — 36415 COLL VENOUS BLD VENIPUNCTURE: CPT

## 2025-04-23 PROCEDURE — 83036 HEMOGLOBIN GLYCOSYLATED A1C: CPT

## 2025-04-23 PROCEDURE — 84443 ASSAY THYROID STIM HORMONE: CPT

## 2025-04-23 RX ORDER — IBANDRONATE SODIUM 150 MG/1
150 TABLET, FILM COATED ORAL
Qty: 3 TABLET | Refills: 3 | Status: SHIPPED | OUTPATIENT
Start: 2025-04-23 | End: 2026-04-23
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
## 2025-04-28 ENCOUNTER — RESULTS FOLLOW-UP (OUTPATIENT)
Dept: INTERNAL MEDICINE | Facility: CLINIC | Age: 70
End: 2025-04-28
Payer: MEDICARE

## 2025-04-28 DIAGNOSIS — I47.29 NSVT (NONSUSTAINED VENTRICULAR TACHYCARDIA): Primary | ICD-10-CM

## 2025-05-02 ENCOUNTER — HOSPITAL ENCOUNTER (OUTPATIENT)
Dept: CARDIOLOGY | Facility: OTHER | Age: 70
Discharge: HOME | End: 2025-05-02
Attending: STUDENT IN AN ORGANIZED HEALTH CARE EDUCATION/TRAINING PROGRAM
Payer: MEDICARE

## 2025-05-02 DIAGNOSIS — I49.9 IRREGULAR CARDIAC RHYTHM: ICD-10-CM

## 2025-05-02 PROCEDURE — 93010 ELECTROCARDIOGRAM REPORT: CPT | Mod: ,,, | Performed by: INTERNAL MEDICINE

## 2025-05-02 PROCEDURE — 93005 ELECTROCARDIOGRAM TRACING: CPT

## 2025-05-03 LAB
OHS QRS DURATION: 70 MS
OHS QTC CALCULATION: 396 MS

## 2025-05-06 ENCOUNTER — HOSPITAL ENCOUNTER (OUTPATIENT)
Dept: CARDIOLOGY | Facility: OTHER | Age: 70
Discharge: HOME | End: 2025-05-06
Attending: STUDENT IN AN ORGANIZED HEALTH CARE EDUCATION/TRAINING PROGRAM
Payer: MEDICARE

## 2025-05-06 DIAGNOSIS — I49.9 IRREGULAR CARDIAC RHYTHM: ICD-10-CM

## 2025-05-06 DIAGNOSIS — R00.0 TACHYCARDIA, UNSPECIFIED: ICD-10-CM

## 2025-05-06 PROCEDURE — 93226 XTRNL ECG REC<48 HR SCAN A/R: CPT

## 2025-05-09 LAB
OHS CV EVENT MONITOR DAY: 0
OHS CV HOLTER LENGTH DECIMAL HOURS: 47.98
OHS CV HOLTER LENGTH HOURS: 47
OHS CV HOLTER LENGTH MINUTES: 59
OHS CV HOLTER SINUS AVERAGE HR: 59
OHS CV HOLTER SINUS MAX HR: 103
OHS CV HOLTER SINUS MIN HR: 41

## 2025-05-11 NOTE — PROGRESS NOTES
Echo and cardiology after so result can be reivewed plese      Good afternoon,    Your holter moinitor did show some episodes of elevated heart rate such that it warrants further investigation.  Let's get you a cardiac echo (ultrasound of the heart) and then a cardiology appt to get recommendation for any additional follow-up, please.    Sincerely,  COTY Batista MD

## 2025-05-12 ENCOUNTER — TELEPHONE (OUTPATIENT)
Dept: INTERNAL MEDICINE | Facility: CLINIC | Age: 70
End: 2025-05-12
Payer: MEDICARE

## 2025-05-12 NOTE — TELEPHONE ENCOUNTER
----- Message from August Batista MD sent at 5/11/2025  4:40 PM CDT -----  Echo and cardiology after so result can be reivewed plese      Good afternoon,    Your holter moinitor did show some episodes of elevated heart rate such that it warrants further investigation.  Let's get you a cardiac echo (ultrasound of the heart) and then a cardiology appt to   get recommendation for any additional follow-up, please.    Sincerely,  COTY Batista MD  ----- Message -----  From: Jessica Quiles MD  Sent: 5/9/2025   5:28 PM CDT  To: August Batista MD

## 2025-05-12 NOTE — TELEPHONE ENCOUNTER
Informed pt of holter results. Pt verbalized understanding. Schedule pt for echo on 6/10/25 and with Dr. Johnson on 6/16/25. Pt verbalized understanding.

## 2025-06-10 ENCOUNTER — HOSPITAL ENCOUNTER (OUTPATIENT)
Dept: CARDIOLOGY | Facility: OTHER | Age: 70
Discharge: HOME | End: 2025-06-10
Attending: STUDENT IN AN ORGANIZED HEALTH CARE EDUCATION/TRAINING PROGRAM
Payer: MEDICARE

## 2025-06-10 ENCOUNTER — RESULTS FOLLOW-UP (OUTPATIENT)
Dept: INTERNAL MEDICINE | Facility: CLINIC | Age: 70
End: 2025-06-10

## 2025-06-10 VITALS
BODY MASS INDEX: 19.12 KG/M2 | WEIGHT: 112 LBS | SYSTOLIC BLOOD PRESSURE: 98 MMHG | HEIGHT: 64 IN | DIASTOLIC BLOOD PRESSURE: 60 MMHG | HEART RATE: 67 BPM

## 2025-06-10 DIAGNOSIS — I47.29 NSVT (NONSUSTAINED VENTRICULAR TACHYCARDIA): ICD-10-CM

## 2025-06-10 LAB
AV INDEX (PROSTH): 0.76
AV MEAN GRADIENT: 2 MMHG
AV PEAK GRADIENT: 5 MMHG
AV VALVE AREA BY VELOCITY RATIO: 2.1 CM²
AV VALVE AREA: 2.1 CM²
AV VELOCITY RATIO: 0.73
BSA FOR ECHO PROCEDURE: 1.51 M2
CV ECHO LV RWT: 0.31 CM
DOP CALC AO PEAK VEL: 1.1 M/S
DOP CALC AO VTI: 27.3 CM
DOP CALC LVOT AREA: 2.8 CM2
DOP CALC LVOT DIAMETER: 1.9 CM
DOP CALC LVOT PEAK VEL: 0.8 M/S
DOP CALC LVOT STROKE VOLUME: 58.7 CM3
DOP CALCLVOT PEAK VEL VTI: 20.7 CM
E WAVE DECELERATION TIME: 228 MSEC
E/A RATIO: 0.83
E/E' RATIO: 7 M/S
ECHO LV POSTERIOR WALL: 0.6 CM (ref 0.6–1.1)
FRACTIONAL SHORTENING: 30.8 % (ref 28–44)
GLOBAL LONGITUIDAL STRAIN: 20.9 %
INTERVENTRICULAR SEPTUM: 0.7 CM (ref 0.6–1.1)
IVC DIAMETER: 0.77 CM
IVRT: 84 MSEC
LA MAJOR: 3.6 CM
LA MINOR: 4.4 CM
LA WIDTH: 3.8 CM
LEFT ATRIUM AREA SYSTOLIC (APICAL 2 CHAMBER): 13.44 CM2
LEFT ATRIUM AREA SYSTOLIC (APICAL 4 CHAMBER): 12.53 CM2
LEFT ATRIUM SIZE: 2.9 CM
LEFT ATRIUM VOLUME INDEX MOD: 23 ML/M2
LEFT ATRIUM VOLUME INDEX: 24 ML/M2
LEFT ATRIUM VOLUME MOD: 35 ML
LEFT ATRIUM VOLUME: 37 CM3
LEFT INTERNAL DIMENSION IN SYSTOLE: 2.7 CM (ref 2.1–4)
LEFT VENTRICLE DIASTOLIC VOLUME INDEX: 43.79 ML/M2
LEFT VENTRICLE DIASTOLIC VOLUME: 67 ML
LEFT VENTRICLE END SYSTOLIC VOLUME APICAL 2 CHAMBER: 28.38 ML
LEFT VENTRICLE END SYSTOLIC VOLUME APICAL 4 CHAMBER: 34.71 ML
LEFT VENTRICLE MASS INDEX: 44.6 G/M2
LEFT VENTRICLE SYSTOLIC VOLUME INDEX: 18.3 ML/M2
LEFT VENTRICLE SYSTOLIC VOLUME: 28 ML
LEFT VENTRICULAR INTERNAL DIMENSION IN DIASTOLE: 3.9 CM (ref 3.5–6)
LEFT VENTRICULAR MASS: 68.2 G
LV LATERAL E/E' RATIO: 7.7 M/S
LV SEPTAL E/E' RATIO: 6.8 M/S
LVED V (TEICH): 67.19 ML
LVES V (TEICH): 27.9 ML
LVOT MG: 1.09 MMHG
LVOT MV: 0.48 CM/S
MV PEAK A VEL: 0.65 M/S
MV PEAK E VEL: 0.54 M/S
MV STENOSIS PRESSURE HALF TIME: 63.43 MS
MV VALVE AREA P 1/2 METHOD: 3.47 CM2
PISA MRMAX VEL: 3.88 M/S
PISA TR MAX VEL: 2.3 M/S
PULM VEIN S/D RATIO: 1.17
PV PEAK D VEL: 0.35 M/S
PV PEAK GRADIENT: 2 MMHG
PV PEAK S VEL: 0.41 M/S
PV PEAK VELOCITY: 0.68 M/S
RA MAJOR: 3.8 CM
RA PRESSURE ESTIMATED: 3 MMHG
RA WIDTH: 4.1 CM
RV TB RVSP: 5 MMHG
TDI LATERAL: 0.07 M/S
TDI SEPTAL: 0.08 M/S
TDI: 0.08 M/S
TR MAX PG: 21 MMHG
TV REST PULMONARY ARTERY PRESSURE: 24 MMHG
Z-SCORE OF LEFT VENTRICULAR DIMENSION IN END DIASTOLE: -1.35
Z-SCORE OF LEFT VENTRICULAR DIMENSION IN END SYSTOLE: -0.2

## 2025-06-10 PROCEDURE — 93356 MYOCRD STRAIN IMG SPCKL TRCK: CPT

## 2025-06-10 PROCEDURE — 93356 MYOCRD STRAIN IMG SPCKL TRCK: CPT | Mod: ,,, | Performed by: INTERNAL MEDICINE

## 2025-06-10 PROCEDURE — 93306 TTE W/DOPPLER COMPLETE: CPT | Mod: 26,,, | Performed by: INTERNAL MEDICINE

## 2025-06-16 ENCOUNTER — OFFICE VISIT (OUTPATIENT)
Dept: CARDIOLOGY | Facility: CLINIC | Age: 70
End: 2025-06-16
Payer: MEDICARE

## 2025-06-16 VITALS
OXYGEN SATURATION: 97 % | SYSTOLIC BLOOD PRESSURE: 102 MMHG | HEIGHT: 64 IN | HEART RATE: 69 BPM | DIASTOLIC BLOOD PRESSURE: 62 MMHG | WEIGHT: 114 LBS | BODY MASS INDEX: 19.46 KG/M2

## 2025-06-16 DIAGNOSIS — I47.29 NSVT (NONSUSTAINED VENTRICULAR TACHYCARDIA): ICD-10-CM

## 2025-06-16 PROCEDURE — 99213 OFFICE O/P EST LOW 20 MIN: CPT | Mod: PBBFAC | Performed by: INTERNAL MEDICINE

## 2025-06-16 PROCEDURE — 99999 PR PBB SHADOW E&M-EST. PATIENT-LVL III: CPT | Mod: PBBFAC,,, | Performed by: INTERNAL MEDICINE

## 2025-06-16 NOTE — PROGRESS NOTES
"[facility]    Chief Complaint  Chief Complaint   Patient presents with    Irregular Heart Beat       HPI:    Here to review her recent cardiac testing.  States it was ordered after a routine visit at which time she was feeling well.  Denies palpitations.  Exercises regularly.  Has had rare episodes of syncope intermittently since childhood.  No exertional dyspnea or chest discomfort.  No exertional syncope.    Medications  Current Medications[1]     History  Past Medical History:   Diagnosis Date    Miscarriage 1984     Past Surgical History:   Procedure Laterality Date    APPENDECTOMY      BREAST BIOPSY      BREAST SURGERY       Social History[2]  Family History   Problem Relation Name Age of Onset    Breast cancer Mother      Stroke Father      Breast cancer Paternal Grandmother          Allergies  Review of patient's allergies indicates:   Allergen Reactions    Ciprofloxacin Other (See Comments)     Caused "neuro problem, felt like I was going to have a seizure."     Sulfa (sulfonamide antibiotics) Other (See Comments)       Review of Systems   Review of Systems   Constitutional: Negative for malaise/fatigue, weight gain and weight loss.   Eyes:  Negative for visual disturbance.   Cardiovascular:  Negative for chest pain, claudication, cyanosis, dyspnea on exertion, irregular heartbeat, leg swelling, near-syncope, orthopnea, palpitations, paroxysmal nocturnal dyspnea and syncope.   Respiratory:  Negative for cough, hemoptysis, shortness of breath, sleep disturbances due to breathing and wheezing.    Hematologic/Lymphatic: Negative for bleeding problem. Does not bruise/bleed easily.   Skin:  Negative for poor wound healing.   Musculoskeletal:  Negative for muscle cramps and myalgias.   Gastrointestinal:  Negative for abdominal pain, anorexia, diarrhea, heartburn, hematemesis, hematochezia, melena, nausea and vomiting.   Genitourinary:  Negative for hematuria and nocturia.   Neurological:  Negative " for excessive daytime sleepiness, dizziness, focal weakness, light-headedness and weakness.       Physical Exam  Vitals:    06/16/25 1528   BP: 102/62   Pulse: 69     Wt Readings from Last 1 Encounters:   06/16/25 51.7 kg (114 lb)     Physical Exam  Vitals and nursing note reviewed.   Constitutional:       General: She is not in acute distress.     Appearance: She is not toxic-appearing or diaphoretic.   HENT:      Head: Normocephalic and atraumatic.      Mouth/Throat:      Lips: Pink.      Mouth: Mucous membranes are moist.   Eyes:      General: No scleral icterus.     Conjunctiva/sclera: Conjunctivae normal.   Neck:      Thyroid: No thyromegaly.      Vascular: No carotid bruit, hepatojugular reflux or JVD.      Trachea: Trachea normal.   Cardiovascular:      Rate and Rhythm: Normal rate and regular rhythm. No extrasystoles are present.     Chest Wall: PMI is not displaced.      Pulses:           Carotid pulses are 2+ on the right side and 2+ on the left side.       Radial pulses are 2+ on the right side and 2+ on the left side.        Dorsalis pedis pulses are 2+ on the right side and 2+ on the left side.        Posterior tibial pulses are 2+ on the right side and 2+ on the left side.      Heart sounds: S1 normal and S2 normal. No murmur heard.     No friction rub. No S3 or S4 sounds.   Pulmonary:      Effort: Pulmonary effort is normal. No accessory muscle usage or respiratory distress.      Breath sounds: Normal breath sounds and air entry. No decreased breath sounds, wheezing, rhonchi or rales.   Abdominal:      General: Bowel sounds are normal. There is no distension or abdominal bruit.      Palpations: Abdomen is soft. There is no hepatomegaly, splenomegaly or pulsatile mass.      Tenderness: There is no abdominal tenderness.   Musculoskeletal:         General: No tenderness or deformity.      Right lower leg: No edema.      Left lower leg: No edema.   Skin:     General: Skin is warm and dry.      Capillary  Refill: Capillary refill takes less than 2 seconds.      Coloration: Skin is not cyanotic or pale.      Nails: There is no clubbing.   Neurological:      General: No focal deficit present.      Mental Status: She is alert and oriented to person, place, and time.   Psychiatric:         Attention and Perception: Attention normal.         Mood and Affect: Mood normal.         Speech: Speech normal.         Behavior: Behavior normal. Behavior is cooperative.         Labs  Hospital Outpatient Visit on 06/10/2025   Component Date Value Ref Range Status    BSA 06/10/2025 1.51  m2 Final    LVOT stroke volume 06/10/2025 58.7  cm3 Final    LVIDd 06/10/2025 3.9  3.5 - 6.0 cm Final    LV Systolic Volume 06/10/2025 28  mL Final    LV Systolic Volume Index 06/10/2025 18.3  mL/m2 Final    LVIDs 06/10/2025 2.7  2.1 - 4.0 cm Final    LV ESV A2C 06/10/2025 28.38  mL Final    LV Diastolic Volume 06/10/2025 67  mL Final    LV ESV A4C 06/10/2025 34.71  mL Final    LV Diastolic Volume Index 06/10/2025 43.79  mL/m2 Final    Left Ventricular End Systolic Volu* 06/10/2025 27.90  mL Final    Left Ventricular End Diastolic Vol* 06/10/2025 67.19  mL Final    IVS 06/10/2025 0.7  0.6 - 1.1 cm Final    LVOT diameter 06/10/2025 1.9  cm Final    LVOT area 06/10/2025 2.8  cm2 Final    FS 06/10/2025 30.8  28 - 44 % Final    Left Ventricle Relative Wall Thick* 06/10/2025 0.31  cm Final    PW 06/10/2025 0.6  0.6 - 1.1 cm Final    LV mass 06/10/2025 68.2  g Final    LV Mass Index 06/10/2025 44.6  g/m2 Final    MV Peak E Anil 06/10/2025 0.54  m/s Final    TDI LATERAL 06/10/2025 0.07  m/s Final    TDI SEPTAL 06/10/2025 0.08  m/s Final    E/E' ratio 06/10/2025 7  m/s Final    MV Peak A Anil 06/10/2025 0.65  m/s Final    TR Max Anil 06/10/2025 2.3  m/s Final    E/A ratio 06/10/2025 0.83   Final    IVRT 06/10/2025 84  msec Final    E wave deceleration time 06/10/2025 228  msec Final    LV SEPTAL E/E' RATIO 06/10/2025 6.8  m/s Final    LV LATERAL E/E' RATIO  06/10/2025 7.7  m/s Final    PV Peak S Anil 06/10/2025 0.41  m/s Final    PV Peak D Anil 06/10/2025 0.35  m/s Final    Pulm vein S/D ratio 06/10/2025 1.17   Final    LVOT peak anil 06/10/2025 0.8  m/s Final    Left Ventricular Outflow Tract Ashlyn* 06/10/2025 0.48  cm/s Final    Left Ventricular Outflow Tract Ashlyn* 06/10/2025 1.09  mmHg Final    LA size 06/10/2025 2.9  cm Final    Left Atrium Minor Axis 06/10/2025 4.4  cm Final    Left Atrium Major Axis 06/10/2025 3.6  cm Final    LA Vol (MOD) 06/10/2025 35  mL Final    SHELBI (MOD) 06/10/2025 23  mL/m2 Final    RA Major Axis 06/10/2025 3.80  cm Final    AV mean gradient 06/10/2025 2  mmHg Final    AV peak gradient 06/10/2025 5  mmHg Final    Ao peak anil 06/10/2025 1.1  m/s Final    Ao VTI 06/10/2025 27.3  cm Final    LVOT peak VTI 06/10/2025 20.7  cm Final    AV valve area 06/10/2025 2.1  cm² Final    AV Velocity Ratio 06/10/2025 0.73   Final    AV index (prosthetic) 06/10/2025 0.76   Final    FRANSICO by Velocity Ratio 06/10/2025 2.1  cm² Final    Mr max anil 06/10/2025 3.88  m/s Final    MV stenosis pressure 1/2 time 06/10/2025 63.43  ms Final    MV valve area p 1/2 method 06/10/2025 3.47  cm2 Final    Triscuspid Valve Regurgitation Pea* 06/10/2025 21  mmHg Final    PV PEAK VELOCITY 06/10/2025 0.68  m/s Final    PV peak gradient 06/10/2025 2  mmHg Final    IVC diameter 06/10/2025 0.77  cm Final    Mean e' 06/10/2025 0.08  m/s Final    ZLVIDS 06/10/2025 -0.20   Final    ZLVIDD 06/10/2025 -1.35   Final    LA area A4C 06/10/2025 12.53  cm2 Final    LA area A2C 06/10/2025 13.44  cm2 Final    SHELBI 06/10/2025 24  mL/m2 Final    LA Vol 06/10/2025 37  cm3 Final    LA WIDTH 06/10/2025 3.8  cm Final    RA Width 06/10/2025 4.1  cm Final    TV resting pulmonary artery pressu* 06/10/2025 24  mmHg Final    RV TB RVSP 06/10/2025 5  mmHg Final    Est. RA pres 06/10/2025 3  mmHg Final    GLS 06/10/2025 20.9  % Final   Hospital Outpatient Visit on 05/06/2025   Component Date Value Ref Range  Status    Event Monitor Day 05/06/2025 0   Final    holter length minutes 05/06/2025 59   Final    Holter length hours 05/06/2025 47   Final    holter length dec hours 05/06/2025 47.98   Final    Sinus min HR 05/06/2025 41   Final    Sinus max hr 05/06/2025 103   Final    Sinus avg hr 05/06/2025 59   Final   Hospital Outpatient Visit on 05/02/2025   Component Date Value Ref Range Status    QRS Duration 05/02/2025 70  ms Final    [facility] QTC Calculation 05/02/2025 396  ms Final   Lab Visit on 04/23/2025   Component Date Value Ref Range Status    Sodium 04/23/2025 138  136 - 145 mmol/L Final    Potassium 04/23/2025 4.2  3.5 - 5.1 mmol/L Final    Chloride 04/23/2025 104  95 - 110 mmol/L Final    CO2 04/23/2025 25  23 - 29 mmol/L Final    Glucose 04/23/2025 86  70 - 110 mg/dL Final    BUN 04/23/2025 16  8 - 23 mg/dL Final    Creatinine 04/23/2025 0.9  0.5 - 1.4 mg/dL Final    Calcium 04/23/2025 9.6  8.7 - 10.5 mg/dL Final    Protein Total 04/23/2025 7.8  6.0 - 8.4 gm/dL Final    Albumin 04/23/2025 4.6  3.5 - 5.2 g/dL Final    Bilirubin Total 04/23/2025 0.5  0.1 - 1.0 mg/dL Final    For infants and newborns, interpretation of results should be based   on gestational age, weight and in agreement with clinical   observations.    Premature Infant recommended reference ranges:   0-24 hours:  <8.0 mg/dL   24-48 hours: <12.0 mg/dL   3-5 days:    <15.0 mg/dL   6-29 days:   <15.0 mg/dL    ALP 04/23/2025 75  40 - 150 unit/L Final    AST 04/23/2025 20  11 - 45 unit/L Final    ALT 04/23/2025 18  10 - 44 unit/L Final    Anion Gap 04/23/2025 9  8 - 16 mmol/L Final    eGFR 04/23/2025 >60  >60 mL/min/1.73/m2 Final    Estimated GFR calculated using the CKD-EPI creatinine (2021) equation.    Cholesterol Total 04/23/2025 205 (H)  120 - 199 mg/dL Final    The National Cholesterol Education Program (NCEP) has set the  following guidelines (reference ranges) for Cholesterol:  Optimal.....................<200 mg/dL  Borderline  High.............200-239 mg/dL  High........................> or = 240 mg/dL    Triglyceride 04/23/2025 77  30 - 150 mg/dL Final    The National Cholesterol Education Program (NCEP) has set the  following guidelines (reference values) for triglycerides:  Normal......................<150 mg/dL  Borderline High.............150-199 mg/dL  High........................200-499 mg/dL    HDL Cholesterol 04/23/2025 76 (H)  40 - 75 mg/dL Final    The National Cholesterol Education Program (NCEP) has set the   following guidelines (reference values) for HDL Cholesterol:   Low...............<40 mg/dL   Optimal...........>60 mg/dL    LDL Cholesterol 04/23/2025 113.6  63.0 - 159.0 mg/dL Final    The National Cholesterol Education Program (NCEP) has set the  following guidelines (reference values) for LDL Cholesterol:  Optimal.......................<130 mg/dL  Borderline High...............130-159 mg/dL  High..........................160-189 mg/dL  Very High.....................>190 mg/dL  LDL calculated using the Friedewald equation.    HDL/Cholesterol Ratio 04/23/2025 37.1  20.0 - 50.0 % Final    Cholesterol/HDL Ratio 04/23/2025 2.7  2.0 - 5.0 Final    Non HDL Cholesterol 04/23/2025 129  mg/dL Final    Risk category and Non-HDL cholesterol goals:  Coronary heart disease (CHD)or equivalent (10-year risk of CHD >20%):  Non-HDL cholesterol goal     <130 mg/dL  Two or more CHD risk factors and 10-year risk of CHD <= 20%:  Non-HDL cholesterol goal     <160 mg/dL  0 to 1 CHD risk factor:  Non-HDL cholesterol goal     <190 mg/dL    TSH 04/23/2025 0.739  0.400 - 4.000 uIU/mL Final    Hemoglobin A1c 04/23/2025 5.5  4.0 - 5.6 % Final    ADA Screening Guidelines:  5.7-6.4%  Consistent with prediabetes  >=6.5%  Consistent with diabetes    High levels of fetal hemoglobin interfere with the HbA1C  assay. Heterozygous hemoglobin variants (HbS, HgC, etc)do  not significantly interfere with this assay.   However, presence of multiple variants  may affect accuracy.    Estimated Average Glucose 04/23/2025 111  68 - 131 mg/dL Final    Magnesium  04/23/2025 2.1  1.6 - 2.6 mg/dL Final    Phosphorus Level 04/23/2025 3.2  2.7 - 4.5 mg/dL Final    WBC 04/23/2025 8.29  3.90 - 12.70 K/uL Final    RBC 04/23/2025 4.21  4.00 - 5.40 M/uL Final    HGB 04/23/2025 13.1  12.0 - 16.0 gm/dL Final    HCT 04/23/2025 40.2  37.0 - 48.5 % Final    MCV 04/23/2025 96  82 - 98 fL Final    MCH 04/23/2025 31.1 (H)  27.0 - 31.0 pg Final    MCHC 04/23/2025 32.6  32.0 - 36.0 g/dL Final    RDW 04/23/2025 12.6  11.5 - 14.5 % Final    Platelet Count 04/23/2025 300  150 - 450 K/uL Final    MPV 04/23/2025 10.7  9.2 - 12.9 fL Final    Nucleated RBC 04/23/2025 0  <=0 /100 WBC Final    Neut % 04/23/2025 64.7  38 - 73 % Final    Lymph % 04/23/2025 26.4  18 - 48 % Final    Mono % 04/23/2025 7.7  4 - 15 % Final    Eos % 04/23/2025 0.5  <=8 % Final    Basophil % 04/23/2025 0.6  <=1.9 % Final    Imm Grans % 04/23/2025 0.1  0.0 - 0.5 % Final    Neut # 04/23/2025 5.36  1.8 - 7.7 K/uL Final    Lymph # 04/23/2025 2.19  1 - 4.8 K/uL Final    Mono # 04/23/2025 0.64  0.3 - 1 K/uL Final    Eos # 04/23/2025 0.04  <=0.5 K/uL Final    Baso # 04/23/2025 0.05  <=0.2 K/uL Final    Imm Grans # 04/23/2025 0.01  0.00 - 0.04 K/uL Final    Mild elevation in immature granulocytes is non specific and can be seen in a variety of conditions including stress response, acute inflammation, trauma and pregnancy. Correlation with other laboratory and clinical findings is essential.   Office Visit on 02/01/2025   Component Date Value Ref Range Status    Molecular Strep A, POC 02/01/2025 Negative  Negative Final     Acceptable 02/01/2025 Yes   Final       Imaging  Echo  Result Date: 6/10/2025    Left Ventricle: The left ventricle is normal in size. Normal wall thickness. There is normal systolic function with a visually estimated ejection fraction of 55 - 60%.  measuring -20.9% There is normal diastolic function.    Right Ventricle: The right ventricle is normal in size Wall thickness is normal. Systolic function is normal.       Assessment  1. NSVT (nonsustained ventricular tachycardia)  - Ambulatory referral/consult to Cardiology      Plan and Discussion    Discussed her test results.  Echocardiogram shows a structurally normal heart.  She has a good exercise capacity with no symptoms to suggest angina.  Would not treat her asymptomatic ectopy.  Reassured.  Were to become symptomatic, could consider magnesium supplementation and/or beta-blockers.    The 10-year ASCVD risk score (Bridget DK, et al., 2019) is: 4.6%    Values used to calculate the score:      Age: 69 years      Sex: Female      Is Non- : No      Diabetic: No      Tobacco smoker: No      Systolic Blood Pressure: 102 mmHg      Is BP treated: No      HDL Cholesterol: 76 mg/dL      Total Cholesterol: 205 mg/dL     Follow Up  Follow up if symptoms worsen or fail to improve.      Jesús Johnson MD          [1]   Current Outpatient Medications   Medication Sig Dispense Refill    calcium carbonate-vitamin D3 600 mg-20 mcg (800 unit) Chew       cholecalciferol, vitamin D3, 125 mcg (5,000 unit) Tab       estradioL (ESTRACE) 0.01 % (0.1 mg/gram) vaginal cream Apply pea-sized amount nightly for two weeks, then twice a week there after 42.5 g 1    ibandronate (BONIVA) 150 mg tablet Take 1 tablet (150 mg total) by mouth every 30 days. 3 tablet 3    mv-min-folic acid-lutein (CENTRUM SILVER) 400-250 mcg Chew        No current facility-administered medications for this visit.   [2]   Social History  Socioeconomic History    Marital status:    Tobacco Use    Smoking status: Never     Passive exposure: Never    Smokeless tobacco: Never    Tobacco comments:     40 years ago smoker   Substance and Sexual Activity    Alcohol use: Yes     Comment: everyday    Drug use: Not Currently    Sexual activity: Not Currently     Partners: Male     Social Drivers  of Health     Financial Resource Strain: Low Risk  (2/17/2025)    Overall Financial Resource Strain (CARDIA)     Difficulty of Paying Living Expenses: Not very hard   Food Insecurity: No Food Insecurity (2/17/2025)    Hunger Vital Sign     Worried About Running Out of Food in the Last Year: Never true     Ran Out of Food in the Last Year: Never true   Transportation Needs: No Transportation Needs (2/17/2025)    PRAPARE - Transportation     Lack of Transportation (Medical): No     Lack of Transportation (Non-Medical): No   Physical Activity: Insufficiently Active (2/17/2025)    Exercise Vital Sign     Days of Exercise per Week: 1 day     Minutes of Exercise per Session: 20 min   Stress: No Stress Concern Present (2/17/2025)    [employer] [employer] - Occupational Stress Questionnaire     Feeling of Stress : Not at all   Housing Stability: Low Risk  (2/17/2025)    Housing Stability Vital Sign     Unable to Pay for Housing in the Last Year: No     Number of Times Moved in the Last Year: 1     Homeless in the Last Year: No

## 2025-07-28 ENCOUNTER — HOSPITAL ENCOUNTER (OUTPATIENT)
Dept: RADIOLOGY | Facility: OTHER | Age: 70
Discharge: HOME | End: 2025-07-28
Attending: STUDENT IN AN ORGANIZED HEALTH CARE EDUCATION/TRAINING PROGRAM
Payer: MEDICARE

## 2025-07-28 DIAGNOSIS — Z12.31 ENCOUNTER FOR SCREENING MAMMOGRAM FOR BREAST CANCER: ICD-10-CM

## 2025-07-28 PROCEDURE — 77063 BREAST TOMOSYNTHESIS BI: CPT | Mod: 26,,, | Performed by: RADIOLOGY

## 2025-07-28 PROCEDURE — 77067 SCR MAMMO BI INCL CAD: CPT | Mod: 26,,, | Performed by: RADIOLOGY

## 2025-07-28 PROCEDURE — 77063 BREAST TOMOSYNTHESIS BI: CPT | Mod: TC

## 2025-09-05 ENCOUNTER — PATIENT MESSAGE (OUTPATIENT)
Dept: INTERNAL MEDICINE | Facility: CLINIC | Age: 70
End: 2025-09-05
Payer: MEDICARE